# Patient Record
Sex: FEMALE | Race: BLACK OR AFRICAN AMERICAN | Employment: UNEMPLOYED | URBAN - METROPOLITAN AREA
[De-identification: names, ages, dates, MRNs, and addresses within clinical notes are randomized per-mention and may not be internally consistent; named-entity substitution may affect disease eponyms.]

---

## 2017-04-27 ENCOUNTER — OFFICE VISIT (OUTPATIENT)
Dept: INTERNAL MEDICINE CLINIC | Age: 42
End: 2017-04-27

## 2017-04-27 ENCOUNTER — HOSPITAL ENCOUNTER (OUTPATIENT)
Dept: LAB | Age: 42
Discharge: HOME | End: 2017-04-27

## 2017-04-27 VITALS
OXYGEN SATURATION: 96 % | RESPIRATION RATE: 18 BRPM | HEART RATE: 106 BPM | DIASTOLIC BLOOD PRESSURE: 80 MMHG | SYSTOLIC BLOOD PRESSURE: 129 MMHG | HEIGHT: 64 IN | BODY MASS INDEX: 46.32 KG/M2 | WEIGHT: 271.3 LBS | TEMPERATURE: 98.6 F

## 2017-04-27 DIAGNOSIS — Z00.00 MEDICARE ANNUAL WELLNESS VISIT, SUBSEQUENT: Primary | ICD-10-CM

## 2017-04-27 DIAGNOSIS — Z71.89 ADVANCED CARE PLANNING/COUNSELING DISCUSSION: ICD-10-CM

## 2017-04-27 DIAGNOSIS — G89.29 CHRONIC BILATERAL LOW BACK PAIN, WITH SCIATICA PRESENCE UNSPECIFIED: ICD-10-CM

## 2017-04-27 DIAGNOSIS — F32.A DEPRESSION, UNSPECIFIED DEPRESSION TYPE: ICD-10-CM

## 2017-04-27 DIAGNOSIS — I10 ESSENTIAL HYPERTENSION: ICD-10-CM

## 2017-04-27 DIAGNOSIS — M54.5 CHRONIC BILATERAL LOW BACK PAIN, WITH SCIATICA PRESENCE UNSPECIFIED: ICD-10-CM

## 2017-04-27 PROCEDURE — 99001 SPECIMEN HANDLING PT-LAB: CPT | Performed by: NURSE PRACTITIONER

## 2017-04-27 RX ORDER — ALPRAZOLAM 1 MG/1
TABLET ORAL
Refills: 0 | COMMUNITY
Start: 2017-04-06 | End: 2017-10-12 | Stop reason: SDUPTHER

## 2017-04-27 RX ORDER — VENLAFAXINE HYDROCHLORIDE 150 MG/1
CAPSULE, EXTENDED RELEASE ORAL
Refills: 0 | COMMUNITY
Start: 2017-04-06 | End: 2017-08-15 | Stop reason: SDUPTHER

## 2017-04-27 RX ORDER — LISINOPRIL AND HYDROCHLOROTHIAZIDE 20; 25 MG/1; MG/1
TABLET ORAL
Refills: 1 | COMMUNITY
Start: 2017-04-06 | End: 2018-04-20 | Stop reason: SDUPTHER

## 2017-04-27 RX ORDER — ZOLPIDEM TARTRATE 5 MG/1
TABLET ORAL
Refills: 0 | COMMUNITY
Start: 2017-04-06 | End: 2017-10-12 | Stop reason: SDUPTHER

## 2017-04-27 NOTE — ACP (ADVANCE CARE PLANNING)
Advance Care Planning (ACP) Provider Note - Comprehensive     Date of ACP Conversation: 04/27/17  Persons included in Conversation:  patient  Length of ACP Conversation in minutes:  16 minutes    Authorized Decision Maker (if patient is incapable of making informed decisions): This person is:   Other Legally Authorized Decision Maker (e.g. Next of Kin)          General ACP for ALL Patients with Decision Making Capacity:   Importance of advance care planning, including choosing a healthcare agent to communicate patient's healthcare decisions if patient lost the ability to make decisions, such as after a sudden illness or accident  Understanding of the healthcare agent role was assessed and information provided    Review of Existing Advance Directive:  What information were you given about medical decisions to consider before completing your advance directive? none    For Serious or Chronic Illness:  Understanding of medical condition      Interventions Provided:  Referral made for ACP follow-up assistance to:  nurse

## 2017-04-27 NOTE — MR AVS SNAPSHOT
Visit Information Date & Time Provider Department Dept. Phone Encounter #  
 4/27/2017  1:30 PM Yara SOLIS Luis Braun, [address] [id number] Follow-up Instructions Return in about 1 month (around 5/27/2017), or if symptoms worsen or fail to improve. Your Appointments 5/9/2017  2:30 PM  
New Patient with Stalin Mata MD  
[facility] ([facility]) Appt Note: new gyn exam, [address] [address]  
[phone]  
  
   
 [place] [address] Upcoming Health Maintenance Date Due Pneumococcal 19-64 Highest Risk (1 of 3 - PCV13) 3/13/1993 DTaP/Tdap/Td series (1 - Tdap) 3/13/1995 PAP AKA CERVICAL CYTOLOGY 3/13/1995 Allergies as of 4/27/2017  Review Complete On: 4/27/2017 By: Anum Covington. Luis Braun NP Severity Noted Reaction Type Reactions Norvasc [Amlodipine]  04/27/2017    Other (comments) Ankle swelling Current Immunizations  Never Reviewed No immunizations on file. Not reviewed this visit You Were Diagnosed With   
  
 Codes Comments Medicare annual wellness visit, subsequent    -  Primary ICD-10-CM: Z00.00 ICD-9-CM: V70.0 Chronic bilateral low back pain, with sciatica presence unspecified     ICD-10-CM: M54.5, G89.29 ICD-9-CM: 724.2, 338.29 Depression, unspecified depression type     ICD-10-CM: F32.9 ICD-9-CM: 303 Smoker     ICD-10-CM: T11.982 ICD-9-CM: 305.1 Vitals BP Pulse Temp Resp Height(growth percentile) Weight(growth percentile) 129/80 (BP 1 Location: Left arm, BP Patient Position: Sitting) (!) 106 98.6 °F (37 °C) (Oral) 18 5' 4\" (1.626 m) 271 lb 4.8 oz (123.1 kg) LMP SpO2 BMI OB Status Smoking Status 04/06/2017 (Exact Date) 96% 46.57 kg/m2 Having regular periods Current Every Day Smoker BMI and BSA Data Body Mass Index Body Surface Area 46.57 kg/m 2 2.36 m 2 Preferred Pharmacy Pharmacy Name Phone [facility] [address]. [phone] Your Updated Medication List  
  
   
This list is accurate as of: 4/27/17  2:11 PM.  Always use your most recent med list.  
  
  
  
  
 ALPRAZolam 1 mg tablet Commonly known as:  XANAX  
take 1 tablet by mouth twice a day  
  
 lisinopril-hydroCHLOROthiazide 20-25 mg per tablet Commonly known as:  PRINZIDE, ZESTORETIC  
take 1 tablet by mouth once daily  
  
 venlafaxine- mg capsule Commonly known as:  EFFEXOR-XR  
take 1 capsule by mouth once daily  
  
 zolpidem 5 mg tablet Commonly known as:  AMBIEN  
take 1 tablet by mouth at bedtime We Performed the Following LIPID PANEL [11669 CPT(R)] METABOLIC PANEL, COMPREHENSIVE [65432 CPT(R)] REFERRAL TO PAIN MANAGEMENT [QBS548 Custom] Comments:  
 Please evaluate patient for chronic back pain REFERRAL TO PSYCHIATRY [REF91 Custom] Comments:  
 Please evaluate patient for depression TSH REFLEX TO T4 [[id number] Custom] Follow-up Instructions Return in about 1 month (around 5/27/2017), or if symptoms worsen or fail to improve. Referral Information Referral ID Referred By Referred To  
  
 [id number] [facility] Not Available Visits Status Start Date End Date 1 New Request 4/27/17 4/27/18 If your referral has a status of pending review or denied, additional information will be sent to support the outcome of this decision. Referral ID Referred By Referred To  
 [id number] Destinee Craven MD  
   [address] [place] [address] [address] [place] [address], [address] [place] [address] Phone: [phone] Fax: [phone] Visits Status Start Date End Date 1 New Request 4/27/17 4/27/18  If your referral has a status of pending review or denied, additional information will be sent to support the outcome of this decision. Patient Instructions 1. As discussed, narcotics not provided by this provider - pain management providers given or consider seeing previous provider 2. I would like you to establish care with psychiatry. Will provide 3 months of psychiatry medications to give you time to establish care with them. 3. Routine labs given. Medicare Wellness Visit, Female The best way to live healthy is to have a healthy lifestyle by eating a well-balanced diet, exercising regularly, limiting alcohol and stopping smoking. Regular physical exams and screening tests are another way to keep healthy. Preventive exams provided by your health care provider can find health problems before they become diseases or illnesses. Preventive services including immunizations, screening tests, monitoring and exams can help you take care of your own health. All people over age 72 should have a pneumovax  and and a prevnar shot to prevent pneumonia. These are once in a lifetime unless you and your provider decide differently. All people over 65 should have a yearly flu shot and a tetanus vaccine every 10 years. A bone mass density to screen for osteoporosis or thinning of the bones should be done every 2 years after 65. Screening for diabetes mellitus with a blood sugar test should be done every year. Glaucoma is a disease of the eye due to increased ocular pressure that can lead to blindness and it should be done every year by an eye professional. 
 
Cardiovascular screening tests that check for elevated lipids (fatty part of blood) which can lead to heart disease and strokes should be done every 5 years. Colorectal screening that evaluates for blood or polyps in your colon should be done yearly as a stool test or every five years as a flexible sigmoidoscope or every 10 years as a colonoscopy up to age 76. Breast cancer screening with a mammogram is recommended biennially  for women age 54-69. Screening for cervical cancer with a pap smear and pelvic exam is recommended for women after age 72 years every 2 years up to age 79 or when the provider and patient decide to stop. If there is a history of cervical abnormalities or other increased risk for cancer then the test is recommended yearly. Hepatitis C screening is also recommended for anyone born between 80 through [address]. A shingles vaccine is also recommended once in a lifetime after age 61. Your Medicare Wellness Exam is recommended annually. Here is a list of your current Health Maintenance items with a due date: 
Health Maintenance Due Topic Date Due  Pneumococcal Vaccine (1 of 3 - PCV13) 03/13/1993  
 DTaP/Tdap/Td  (1 - Tdap) 03/13/1995  Cervical Cancer Screening  03/13/1995 Stopping Smoking: Care Instructions Your Care Instructions Cigarette smokers crave the nicotine in cigarettes. Giving it up is much harder than simply changing a habit. Your body has to stop craving the nicotine. It is hard to quit, but you can do it. There are many tools that people use to quit smoking. You may find that combining tools works best for you. There are several steps to quitting. First you get ready to quit. Then you get support to help you. After that, you learn new skills and behaviors to become a nonsmoker. For many people, a necessary step is getting and using medicine. Your doctor will help you set up the plan that best meets your needs. You may want to attend a smoking cessation program to help you quit smoking. When you choose a program, look for one that has proven success. Ask your doctor for ideas.  You will greatly increase your chances of success if you take medicine as well as get counseling or join a cessation program. 
Some of the changes you feel when you first quit tobacco are uncomfortable. Your body will miss the nicotine at first, and you may feel short-tempered and grumpy. You may have trouble sleeping or concentrating. Medicine can help you deal with these symptoms. You may struggle with changing your smoking habits and rituals. The last step is the tricky one: Be prepared for the smoking urge to continue for a time. This is a lot to deal with, but keep at it. You will feel better. Follow-up care is a key part of your treatment and safety. Be sure to make and go to all appointments, and call your doctor if you are having problems. Its also a good idea to know your test results and keep a list of the medicines you take. How can you care for yourself at home? · Ask your family, friends, and coworkers for support. You have a better chance of quitting if you have help and support. · Join a support group, such as Nicotine Anonymous, for people who are trying to quit smoking. · Consider signing up for a smoking cessation program, such as the American Lung Association's Freedom from Smoking program. 
· Set a quit date. Pick your date carefully so that it is not right in the middle of a big deadline or stressful time. Once you quit, do not even take a puff. Get rid of all ashtrays and lighters after your last cigarette. Clean your house and your clothes so that they do not smell of smoke. · Learn how to be a nonsmoker. Think about ways you can avoid those things that make you reach for a cigarette. ¨ Avoid situations that put you at greatest risk for smoking. For some people, it is hard to have a drink with friends without smoking. For others, they might skip a coffee break with coworkers who smoke. ¨ Change your daily routine. Take a different route to work or eat a meal in a different place. · Cut down on stress. Calm yourself or release tension by doing an activity you enjoy, such as reading a book, taking a hot bath, or gardening. · Talk to your doctor or pharmacist about nicotine replacement therapy, which replaces the nicotine in your body. You still get nicotine but you do not use tobacco. Nicotine replacement products help you slowly reduce the amount of nicotine you need. These products come in several forms, many of them available over-the-counter: ¨ Nicotine patches ¨ Nicotine gum and lozenges ¨ Nicotine inhaler · Ask your doctor about bupropion (Wellbutrin) or varenicline (Chantix), which are prescription medicines. They do not contain nicotine. They help you by reducing withdrawal symptoms, such as stress and anxiety. · Some people find hypnosis, acupuncture, and massage helpful for ending the smoking habit. · Eat a healthy diet and get regular exercise. Having healthy habits will help your body move past its craving for nicotine. · Be prepared to keep trying. Most people are not successful the first few times they try to quit. Do not get mad at yourself if you smoke again. Make a list of things you learned and think about when you want to try again, such as next week, next month, or next year. Where can you learn more? Go to http://abiSixteen Eighteen Designclifford.info/. Enter F758 in the search box to learn more about \"Stopping Smoking: Care Instructions. \" Current as of: May 26, 2016 Content Version: 11.2 © 5508-8898 [employer]. Care instructions adapted under license by [employer] (which disclaims liability or warranty for this information). If you have questions about a medical condition or this instruction, always ask your healthcare professional. [place] [address] any warranty or liability for your use of this information. Introducing [facility]! [facility] introduces Comfy patient portal. Now you can access parts of your medical record, email your doctor's office, and request medication refills online.    
 
1. In your internet browser, go to [email or web address] 2. Click on the First Time User? Click Here link in the Sign In box. You will see the New Member Sign Up page. 3. Enter your TrashOut Access Code exactly as it appears below. You will not need to use this code after youve completed the sign-up process. If you do not sign up before the expiration date, you must request a new code. · TrashOut Access Code: [email or web address] Expires: 7/26/2017  2:10 PM 
 
4. Enter the last four digits of your Social Security Number (xxxx) and Date of Birth (mm/dd/yyyy) as indicated and click Submit. You will be taken to the next sign-up page. 5. Create a [place] ID. This will be your TrashOut login ID and cannot be changed, so think of one that is secure and easy to remember. 6. Create a TrashOut password. You can change your password at any time. 7. Enter your Password Reset Question and Answer. This can be used at a later time if you forget your password. 8. Enter your e-mail address. You will receive e-mail notification when new information is available in [address] 9. Click Sign Up. You can now view and download portions of your medical record. 10. Click the Download Summary menu link to download a portable copy of your medical information. If you have questions, please visit the Frequently Asked Questions section of the TrashOut website. Remember, TrashOut is NOT to be used for urgent needs. For medical emergencies, dial 911. Now available from your iPhone and Android! Please provide this summary of care documentation to your next provider. Your primary care clinician is listed as Mady Rondon. If you have any questions after today's visit, please call [phone].

## 2017-04-27 NOTE — PATIENT INSTRUCTIONS
1. As discussed, narcotics not provided by this provider - pain management providers given or consider seeing previous provider     2. I would like you to establish care with psychiatry. Will provide 3 months of psychiatry medications to give you time to establish care with them. 3. Routine labs given. Medicare Wellness Visit, Female    The best way to live healthy is to have a healthy lifestyle by eating a well-balanced diet, exercising regularly, limiting alcohol and stopping smoking. Regular physical exams and screening tests are another way to keep healthy. Preventive exams provided by your health care provider can find health problems before they become diseases or illnesses. Preventive services including immunizations, screening tests, monitoring and exams can help you take care of your own health. All people over age 72 should have a pneumovax  and and a prevnar shot to prevent pneumonia. These are once in a lifetime unless you and your provider decide differently. All people over 65 should have a yearly flu shot and a tetanus vaccine every 10 years. A bone mass density to screen for osteoporosis or thinning of the bones should be done every 2 years after 65. Screening for diabetes mellitus with a blood sugar test should be done every year. Glaucoma is a disease of the eye due to increased ocular pressure that can lead to blindness and it should be done every year by an eye professional.    Cardiovascular screening tests that check for elevated lipids (fatty part of blood) which can lead to heart disease and strokes should be done every 5 years. Colorectal screening that evaluates for blood or polyps in your colon should be done yearly as a stool test or every five years as a flexible sigmoidoscope or every 10 years as a colonoscopy up to age 76. Breast cancer screening with a mammogram is recommended biennially  for women age 54-69.     Screening for cervical cancer with a pap smear and pelvic exam is recommended for women after age 72 years every 2 years up to age 79 or when the provider and patient decide to stop. If there is a history of cervical abnormalities or other increased risk for cancer then the test is recommended yearly. Hepatitis C screening is also recommended for anyone born between 80 through [address]. A shingles vaccine is also recommended once in a lifetime after age 61. Your Medicare Wellness Exam is recommended annually. Here is a list of your current Health Maintenance items with a due date:  Health Maintenance Due   Topic Date Due    Pneumococcal Vaccine (1 of 3 - PCV13) 03/13/1993    DTaP/Tdap/Td  (1 - Tdap) 03/13/1995    Cervical Cancer Screening  03/13/1995          Stopping Smoking: Care Instructions  Your Care Instructions  Cigarette smokers crave the nicotine in cigarettes. Giving it up is much harder than simply changing a habit. Your body has to stop craving the nicotine. It is hard to quit, but you can do it. There are many tools that people use to quit smoking. You may find that combining tools works best for you. There are several steps to quitting. First you get ready to quit. Then you get support to help you. After that, you learn new skills and behaviors to become a nonsmoker. For many people, a necessary step is getting and using medicine. Your doctor will help you set up the plan that best meets your needs. You may want to attend a smoking cessation program to help you quit smoking. When you choose a program, look for one that has proven success. Ask your doctor for ideas. You will greatly increase your chances of success if you take medicine as well as get counseling or join a cessation program.  Some of the changes you feel when you first quit tobacco are uncomfortable. Your body will miss the nicotine at first, and you may feel short-tempered and grumpy. You may have trouble sleeping or concentrating.  Medicine can help you deal with these symptoms. You may struggle with changing your smoking habits and rituals. The last step is the tricky one: Be prepared for the smoking urge to continue for a time. This is a lot to deal with, but keep at it. You will feel better. Follow-up care is a key part of your treatment and safety. Be sure to make and go to all appointments, and call your doctor if you are having problems. Its also a good idea to know your test results and keep a list of the medicines you take. How can you care for yourself at home? · Ask your family, friends, and coworkers for support. You have a better chance of quitting if you have help and support. · Join a support group, such as Nicotine Anonymous, for people who are trying to quit smoking. · Consider signing up for a smoking cessation program, such as the American Lung Association's Freedom from Smoking program.  · Set a quit date. Pick your date carefully so that it is not right in the middle of a big deadline or stressful time. Once you quit, do not even take a puff. Get rid of all ashtrays and lighters after your last cigarette. Clean your house and your clothes so that they do not smell of smoke. · Learn how to be a nonsmoker. Think about ways you can avoid those things that make you reach for a cigarette. ¨ Avoid situations that put you at greatest risk for smoking. For some people, it is hard to have a drink with friends without smoking. For others, they might skip a coffee break with coworkers who smoke. ¨ Change your daily routine. Take a different route to work or eat a meal in a different place. · Cut down on stress. Calm yourself or release tension by doing an activity you enjoy, such as reading a book, taking a hot bath, or gardening. · Talk to your doctor or pharmacist about nicotine replacement therapy, which replaces the nicotine in your body.  You still get nicotine but you do not use tobacco. Nicotine replacement products help you slowly reduce the amount of nicotine you need. These products come in several forms, many of them available over-the-counter:  ¨ Nicotine patches  ¨ Nicotine gum and lozenges  ¨ Nicotine inhaler  · Ask your doctor about bupropion (Wellbutrin) or varenicline (Chantix), which are prescription medicines. They do not contain nicotine. They help you by reducing withdrawal symptoms, such as stress and anxiety. · Some people find hypnosis, acupuncture, and massage helpful for ending the smoking habit. · Eat a healthy diet and get regular exercise. Having healthy habits will help your body move past its craving for nicotine. · Be prepared to keep trying. Most people are not successful the first few times they try to quit. Do not get mad at yourself if you smoke again. Make a list of things you learned and think about when you want to try again, such as next week, next month, or next year. Where can you learn more? Go to http://abi-clifford.info/. Enter B772 in the search box to learn more about \"Stopping Smoking: Care Instructions. \"  Current as of: May 26, 2016  Content Version: 11.2  © 4884-7435 [employer]. Care instructions adapted under license by [employer] (which disclaims liability or warranty for this information). If you have questions about a medical condition or this instruction, always ask your healthcare professional. [address] any warranty or liability for your use of this information.
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
## 2017-04-28 LAB
ALBUMIN SERPL-MCNC: 4.1 G/DL (ref 3.5–5.5)
ALBUMIN/GLOB SERPL: 1.4 {RATIO} (ref 1.2–2.2)
ALP SERPL-CCNC: 101 IU/L (ref 39–117)
ALT SERPL-CCNC: 29 IU/L (ref 0–32)
AST SERPL-CCNC: 18 IU/L (ref 0–40)
BILIRUB SERPL-MCNC: 0.2 MG/DL (ref 0–1.2)
BUN SERPL-MCNC: 13 MG/DL (ref 6–24)
BUN/CREAT SERPL: 18 (ref 9–23)
CALCIUM SERPL-MCNC: 9.4 MG/DL (ref 8.7–10.2)
CHLORIDE SERPL-SCNC: 102 MMOL/L (ref 96–106)
CHOLEST SERPL-MCNC: 194 MG/DL (ref 100–199)
CO2 SERPL-SCNC: 22 MMOL/L (ref 18–29)
CREAT SERPL-MCNC: 0.72 MG/DL (ref 0.57–1)
GLOBULIN SER CALC-MCNC: 3 G/DL (ref 1.5–4.5)
GLUCOSE SERPL-MCNC: 102 MG/DL (ref 65–99)
HDLC SERPL-MCNC: 45 MG/DL
INTERPRETATION, 910389: NORMAL
LDLC SERPL CALC-MCNC: 121 MG/DL (ref 0–99)
POTASSIUM SERPL-SCNC: 4.1 MMOL/L (ref 3.5–5.2)
PROT SERPL-MCNC: 7.1 G/DL (ref 6–8.5)
SODIUM SERPL-SCNC: 139 MMOL/L (ref 134–144)
TRIGL SERPL-MCNC: 139 MG/DL (ref 0–149)
TSH SERPL DL<=0.005 MIU/L-ACNC: 1.07 UIU/ML (ref 0.45–4.5)
VLDLC SERPL CALC-MCNC: 28 MG/DL (ref 5–40)

## 2017-05-10 PROBLEM — G47.30 SLEEP APNEA: Status: ACTIVE | Noted: 2017-05-10

## 2017-05-10 PROBLEM — G89.29 CHRONIC PAIN: Status: ACTIVE | Noted: 2017-05-10

## 2017-06-21 ENCOUNTER — OFFICE VISIT (OUTPATIENT)
Dept: BEHAVIORAL/MENTAL HEALTH CLINIC | Age: 42
End: 2017-06-21

## 2017-06-21 VITALS
SYSTOLIC BLOOD PRESSURE: 96 MMHG | BODY MASS INDEX: 46.78 KG/M2 | DIASTOLIC BLOOD PRESSURE: 67 MMHG | HEIGHT: 64 IN | HEART RATE: 90 BPM | OXYGEN SATURATION: 98 % | WEIGHT: 274 LBS

## 2017-06-21 DIAGNOSIS — F32.A ANXIETY AND DEPRESSION: Primary | ICD-10-CM

## 2017-06-21 DIAGNOSIS — F41.9 ANXIETY AND DEPRESSION: Primary | ICD-10-CM

## 2017-06-21 RX ORDER — VENLAFAXINE HYDROCHLORIDE 150 MG/1
150 CAPSULE, EXTENDED RELEASE ORAL DAILY
Qty: 30 CAP | Refills: 1 | Status: SHIPPED | OUTPATIENT
Start: 2017-06-21 | End: 2017-10-12 | Stop reason: SDUPTHER

## 2017-06-21 RX ORDER — VENLAFAXINE HYDROCHLORIDE 75 MG/1
75 CAPSULE, EXTENDED RELEASE ORAL DAILY
Qty: 30 CAP | Refills: 1 | Status: SHIPPED | OUTPATIENT
Start: 2017-06-21 | End: 2017-08-15 | Stop reason: SDUPTHER
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
## 2017-08-15 ENCOUNTER — OFFICE VISIT (OUTPATIENT)
Dept: BEHAVIORAL/MENTAL HEALTH CLINIC | Age: 42
End: 2017-08-15

## 2017-08-15 VITALS
WEIGHT: 278 LBS | HEART RATE: 98 BPM | OXYGEN SATURATION: 97 % | HEIGHT: 64 IN | SYSTOLIC BLOOD PRESSURE: 108 MMHG | BODY MASS INDEX: 47.46 KG/M2 | DIASTOLIC BLOOD PRESSURE: 82 MMHG

## 2017-08-15 DIAGNOSIS — F32.A ANXIETY AND DEPRESSION: Primary | ICD-10-CM

## 2017-08-15 DIAGNOSIS — F41.9 ANXIETY AND DEPRESSION: Primary | ICD-10-CM

## 2017-08-15 RX ORDER — VENLAFAXINE HYDROCHLORIDE 75 MG/1
75 CAPSULE, EXTENDED RELEASE ORAL DAILY
Qty: 30 CAP | Refills: 1 | Status: SHIPPED | OUTPATIENT
Start: 2017-08-15 | End: 2017-10-12 | Stop reason: SDUPTHER
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
## 2017-09-21 ENCOUNTER — OFFICE VISIT (OUTPATIENT)
Dept: BEHAVIORAL/MENTAL HEALTH CLINIC | Age: 42
End: 2017-09-21

## 2017-09-21 VITALS
OXYGEN SATURATION: 97 % | HEART RATE: 113 BPM | SYSTOLIC BLOOD PRESSURE: 122 MMHG | DIASTOLIC BLOOD PRESSURE: 80 MMHG | WEIGHT: 273 LBS | HEIGHT: 64 IN | BODY MASS INDEX: 46.61 KG/M2

## 2017-09-21 DIAGNOSIS — F32.A ANXIETY AND DEPRESSION: Primary | ICD-10-CM

## 2017-09-21 DIAGNOSIS — F41.9 ANXIETY AND DEPRESSION: Primary | ICD-10-CM

## 2017-09-21 NOTE — MR AVS SNAPSHOT
Visit Information Date & Time Provider Department Dept. Phone Encounter #  
 9/21/2017  8:30 AM Shruthi Mack, [facility] [phone] [id number] Your Appointments 10/12/2017 10:00 AM  
ESTABLISHED PATIENT with Mono Melissa NP [facility] ([facility]) Appt Note: 2 month follow-up [address] [facility] [address] [facility] Karly Marrero 178  
  
   
 [address] [facility] [address] [facility] [address] [place] [address] Upcoming Health Maintenance Date Due Pneumococcal 19-64 Highest Risk (1 of 3 - PCV13) 3/13/1993 DTaP/Tdap/Td series (1 - Tdap) 3/13/1995 PAP AKA CERVICAL CYTOLOGY 3/13/1995 INFLUENZA AGE 9 TO ADULT 8/1/2017 Allergies as of 9/21/2017  Review Complete On: 9/21/2017 By: Mariusz Warner CNA Severity Noted Reaction Type Reactions Norvasc [Amlodipine]  04/27/2017    Other (comments) Ankle swelling Current Immunizations  Never Reviewed No immunizations on file. Not reviewed this visit Vitals BP Pulse Height(growth percentile) Weight(growth percentile) SpO2 BMI  
 122/80 (BP 1 Location: Left arm, BP Patient Position: Sitting) (!) 113 5' 4\" (1.626 m) 273 lb (123.8 kg) 97% 46.86 kg/m2 OB Status Smoking Status Having regular periods Current Every Day Smoker Vitals History BMI and BSA Data Body Mass Index Body Surface Area  
 46.86 kg/m 2 2.36 m 2 Preferred Pharmacy Pharmacy Name Phone [facility] [address] [place] [address] [phone] Your Updated Medication List  
  
   
This list is accurate as of: 9/21/17  9:16 AM.  Always use your most recent med list.  
  
  
  
  
 ALPRAZolam 1 mg tablet Commonly known as:  XANAX  
take 1 tablet by mouth twice a day  
  
 lisinopril-hydroCHLOROthiazide 20-25 mg per tablet Commonly known as:  PRINZIDE, ZESTORETIC  
take 1 tablet by mouth once daily * venlafaxine- mg capsule Commonly known as:  EFFEXOR-XR Take 1 Cap by mouth daily. * venlafaxine-SR 75 mg capsule Commonly known as:  EFFEXOR-XR Take 1 Cap by mouth daily. zolpidem 5 mg tablet Commonly known as:  AMBIEN  
take 1 tablet by mouth at bedtime * Notice: This list has 2 medication(s) that are the same as other medications prescribed for you. Read the directions carefully, and ask your doctor or other care provider to review them with you. Introducing [facility] & HEALTH SERVICES! [address] [facility] introduces [employer] patient portal. Now you can access parts of your medical record, email your doctor's office, and request medication refills online. 1. In your internet browser, go to [email or web address] 2. Click on the First Time User? Click Here link in the Sign In box. You will see the New Member Sign Up page. 3. Enter your Ps[employer]dex Access Code exactly as it appears below. You will not need to use this code after youve completed the sign-up process. If you do not sign up before the expiration date, you must request a new code. · Psydex Access Code: [email or web address] Expires: 12/20/2017  9:16 AM 
 
4. Enter the last four digits of your Social Security Number (xxxx) and Date of Birth (mm/dd/yyyy) as indicated and click Submit. You will be taken to the next sign-up page. 5. Create a Psydex ID. This will be your Psydex login ID and cannot be changed, so think of one that is secure and easy to remember. 6. Create a Psydex password. You can change your password at any time. 7. Enter your Password Reset Question and Answer. This can be used at a later time if you forget your password. 8. Enter your e-mail address. You will receive e-mail notification when new information is available in [address] 9. Click Sign Up. You can now view and download portions of your medical record.  
10. Click the Download Summary menu link to download a portable copy of your medical information. If you have questions, please visit the Frequently Asked Questions section of the .[email or web address] website. Remember, [employer]build is NOT to be used for urgent needs. For medical emergencies, dial 911. Now available from your iPhone and Android! Please provide this summary of care documentation to your next provider. Your primary care clinician is listed as Alonso Anguiano. If you have any questions after today's visit, please call [phone].
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
## 2017-10-03 ENCOUNTER — OFFICE VISIT (OUTPATIENT)
Dept: BEHAVIORAL/MENTAL HEALTH CLINIC | Age: 42
End: 2017-10-03

## 2017-10-03 VITALS
OXYGEN SATURATION: 99 % | HEART RATE: 92 BPM | SYSTOLIC BLOOD PRESSURE: 120 MMHG | BODY MASS INDEX: 46.44 KG/M2 | DIASTOLIC BLOOD PRESSURE: 80 MMHG | HEIGHT: 64 IN | WEIGHT: 272 LBS

## 2017-10-03 DIAGNOSIS — F41.9 ANXIETY AND DEPRESSION: Primary | ICD-10-CM

## 2017-10-03 DIAGNOSIS — F32.A ANXIETY AND DEPRESSION: Primary | ICD-10-CM

## 2017-10-03 NOTE — MR AVS SNAPSHOT
Visit Information Date & Time Provider Department Dept. Phone Encounter #  
 10/3/2017 10:30 AM [facility] [phone] [id number] Your Appointments 10/12/2017 10:00 AM  
ESTABLISHED PATIENT with Andres Brandt NP [facility] ([facility]) Appt Note: 2 month follow-up [address] [facility] [address] [place] [address] Karly Marrero 178  
  
   
 [address] [facility] [address] Upcoming Health Maintenance Date Due Pneumococcal 19-64 Highest Risk (1 of 3 - PCV13) 3/13/1993 DTaP/Tdap/Td series (1 - Tdap) 3/13/1995 PAP AKA CERVICAL CYTOLOGY 3/13/1995 INFLUENZA AGE 9 TO ADULT 8/1/2017 Allergies as of 10/3/2017  Review Complete On: 10/3/2017 By: Heidy Ramos Severity Noted Reaction Type Reactions Norvasc [Amlodipine]  04/27/2017    Other (comments) Ankle swelling Current Immunizations  Never Reviewed No immunizations on file. Not reviewed this visit Vitals BP Pulse Height(growth percentile) Weight(growth percentile) SpO2 BMI  
 120/80 (BP 1 Location: Left arm, BP Patient Position: Sitting) 92 5' 4\" (1.626 m) 272 lb (123.4 kg) 99% 46.69 kg/m2 OB Status Smoking Status Having regular periods Current Every Day Smoker Vitals History BMI and BSA Data Body Mass Index Body Surface Area  
 46.69 kg/m 2 2.36 m 2 Preferred Pharmacy Pharmacy Name Phone [facility] [address] [place] [address] [phone] Your Updated Medication List  
  
   
This list is accurate as of: 10/3/17 11:17 AM.  Always use your most recent med list.  
  
  
  
  
 ALPRAZolam 1 mg tablet Commonly known as:  XANAX  
take 1 tablet by mouth twice a day  
  
 lisinopril-hydroCHLOROthiazide 20-25 mg per tablet Commonly known as:  PRINZIDE, ZESTORETIC  
take 1 tablet by mouth once daily * venlafaxine- mg capsule Commonly known as:  EFFEXOR-XR Take 1 Cap by mouth daily. * venlafaxine-SR 75 mg capsule Commonly known as:  EFFEXOR-XR Take 1 Cap by mouth daily. zolpidem 5 mg tablet Commonly known as:  AMBIEN  
take 1 tablet by mouth at bedtime * Notice: This list has 2 medication(s) that are the same as other medications prescribed for you. Read the directions carefully, and ask your doctor or other care provider to review them with you. Introducing [facility] & HEALTH SERVICES! Molina Mag[place] introduces [employer] patient portal. Now you can access parts of your medical record, email your doctor's office, and request medication refills online. 1. In your internet browser, go to [email or web address] 2. Click on the First Time User? Click Here link in the Sign In box. You will see the New Member Sign Up page. 3. Enter your [employer] Access Code exactly as it appears below. You will not need to use this code after youve completed the sign-up process. If you do not sign up before the expiration date, you must request a new code. · [employer] Access Code: [email or web address] Expires: 12/20/2017  9:16 AM 
 
4. Enter the last four digits of your Social Security Number (xxxx) and Date of Birth (mm/dd/yyyy) as indicated and click Submit. You will be taken to the next sign-up page. 5. Create a [employer] ID. This will be your [employer] login ID and cannot be changed, so think of one that is secure and easy to remember. 6. Create a [employer] password. You can change your password at any time. 7. Enter your Password Reset Question and Answer. This can be used at a later time if you forget your password. 8. Enter your e-mail address. You will receive e-mail notification when new information is available in [address] 9. Click Sign Up. You can now view and download portions of your medical record.  
10. Click the Download Summary menu link to download a portable copy of your medical information. If you have questions, please visit the Frequently Asked Questions section of the Yabidu website. Remember, Yab[employer]u is NOT to be used for urgent needs. For medical emergencies, dial 911. Now available from your iPhone and Android! Please provide this summary of care documentation to your next provider. Your primary care clinician is listed as Crystal Laird. If you have any questions after today's visit, please call [phone].
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
## 2017-10-12 ENCOUNTER — OFFICE VISIT (OUTPATIENT)
Dept: BEHAVIORAL/MENTAL HEALTH CLINIC | Age: 42
End: 2017-10-12

## 2017-10-12 VITALS
SYSTOLIC BLOOD PRESSURE: 114 MMHG | HEART RATE: 91 BPM | WEIGHT: 280 LBS | HEIGHT: 64 IN | BODY MASS INDEX: 47.8 KG/M2 | OXYGEN SATURATION: 98 % | DIASTOLIC BLOOD PRESSURE: 76 MMHG

## 2017-10-12 DIAGNOSIS — F32.A ANXIETY AND DEPRESSION: Primary | ICD-10-CM

## 2017-10-12 DIAGNOSIS — F41.9 ANXIETY AND DEPRESSION: Primary | ICD-10-CM

## 2017-10-12 DIAGNOSIS — Z72.0 TOBACCO USE: ICD-10-CM

## 2017-10-12 RX ORDER — VENLAFAXINE HYDROCHLORIDE 150 MG/1
150 CAPSULE, EXTENDED RELEASE ORAL DAILY
Qty: 30 CAP | Refills: 2 | Status: SHIPPED | OUTPATIENT
Start: 2017-10-12 | End: 2018-03-04

## 2017-10-12 RX ORDER — ALPRAZOLAM 1 MG/1
TABLET ORAL
Qty: 60 TAB | Refills: 2 | Status: SHIPPED | OUTPATIENT
Start: 2017-10-12 | End: 2022-06-01

## 2017-10-12 RX ORDER — ZOLPIDEM TARTRATE 5 MG/1
TABLET ORAL
Qty: 30 TAB | Refills: 1 | Status: SHIPPED | OUTPATIENT
Start: 2017-10-12 | End: 2018-04-20 | Stop reason: SDUPTHER

## 2017-10-12 RX ORDER — VENLAFAXINE HYDROCHLORIDE 75 MG/1
75 CAPSULE, EXTENDED RELEASE ORAL DAILY
Qty: 30 CAP | Refills: 2 | Status: SHIPPED | OUTPATIENT
Start: 2017-10-12 | End: 2018-04-20 | Stop reason: SDUPTHER
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
## 2017-10-12 NOTE — MR AVS SNAPSHOT
Visit Information Date & Time Provider Department Dept. Phone Encounter #  
 10/12/2017 10:00 AM Everette Trujillo NP [facility] [phone] Your Appointments 10/17/2017  9:30 AM  
COUNSELING with Rogelio Breen LCSW [facility] ([facility]) Appt Note: Counseling [address] [facility] [address] [facility] Karly Marrero [address]  
  
   
 [address] [place] [address] [address] [place] 7 [address] Upcoming Health Maintenance Date Due Pneumococcal 19-64 Highest Risk (1 of 3 - PCV13) 3/13/1993 DTaP/Tdap/Td series (1 - Tdap) 3/13/1995 PAP AKA CERVICAL CYTOLOGY 3/13/1995 INFLUENZA AGE 9 TO ADULT 8/1/2017 Allergies as of 10/12/2017  Review Complete On: 10/12/2017 By: Ezio Delong CNA Severity Noted Reaction Type Reactions Norvasc [Amlodipine]  04/27/2017    Other (comments) Ankle swelling Current Immunizations  Never Reviewed No immunizations on file. Not reviewed this visit You Were Diagnosed With   
  
 Codes Comments Anxiety and depression    -  Primary ICD-10-CM: F41.8 ICD-9-CM: 300.00, 311 Tobacco use     ICD-10-CM: Z72.0 ICD-9-CM: 305.1 Vitals BP Pulse Height(growth percentile) Weight(growth percentile) SpO2 BMI  
 114/76 (BP 1 Location: Left arm, BP Patient Position: Sitting) 91 5' 4\" (1.626 m) 280 lb (127 kg) 98% 48.06 kg/m2 OB Status Smoking Status Having regular periods Current Every Day Smoker Vitals History BMI and BSA Data Body Mass Index Body Surface Area 48.06 kg/m 2 2.39 m 2 Preferred Pharmacy Pharmacy Name Phone [facility] [address] [place] [address] [phone] Your Updated Medication List  
  
   
This list is accurate as of: 10/12/17 10:28 AM.  Always use your most recent med list.  
  
  
  
  
 ALPRAZolam 1 mg tablet Commonly known as:  [place] take 1 tablet by mouth twice a day  
  
 lisinopril-hydroCHLOROthiazide 20-25 mg per tablet Commonly known as:  PRINZIDE, ZESTORETIC  
take 1 tablet by mouth once daily * venlafaxine- mg capsule Commonly known as:  EFFEXOR-XR Take 1 Cap by mouth daily. * venlafaxine-SR 75 mg capsule Commonly known as:  EFFEXOR-XR Take 1 Cap by mouth daily. zolpidem 5 mg tablet Commonly known as:  AMBIEN  
take 1 tablet by mouth at bedtime * Notice: This list has 2 medication(s) that are the same as other medications prescribed for you. Read the directions carefully, and ask your doctor or other care provider to review them with you. Prescriptions Printed Refills ALPRAZolam (XANAX) 1 mg tablet 2 Sig: take 1 tablet by mouth twice a day Class: Print  
 zolpidem (AMBIEN) 5 mg tablet 1 Sig: take 1 tablet by mouth at bedtime Class: Print Prescriptions Sent to Pharmacy Refills  
 venlafaxine-SR (EFFEXOR-XR) 150 mg capsule 2 Sig: Take 1 Cap by mouth daily. Class: Normal  
 Pharmacy: [address] [place] [address]. Ph #: [phone] Route: Oral  
 venlafaxine-SR (EFFEXOR-XR) 75 mg capsule 2 Sig: Take 1 Cap by mouth daily. Class: Normal  
 Pharmacy: [address] [place] [address]. Ph #: [phone] Route: Oral  
  
We Performed the Following 10-PANEL URINE DRUG SCREEN [[id number] Custom] Introducing [facility] SERVICES! [facility] introduces [employer] patient portal. Now you can access parts of your medical record, email your doctor's office, and request medication refills online. 1. In your internet browser, go to [email or web address] 2. Click on the First Time User? Click Here link in the Sign In box. You will see the New Member Sign Up page. 3. Enter your [employer] Access Code exactly as it appears below.  You will not need to use this code after youve completed the sign-up process. If you do not sign up before the expiration date, you must request a new code. · [email or web address] Access Code: [email or web address] Expires: 12/20/2017  9:16 AM 
 
4. Enter the last four digits of your Social Security Number (xxxx) and Date of Birth (mm/dd/yyyy) as indicated and click Submit. You will be taken to the next sign-up page. 5. Create a [email or web address] ID. This will be your [email or web address] login ID and cannot be changed, so think of one that is secure and easy to remember. 6. Create a [employer] password. You can change your password at any time. 7. Enter your Password Reset Question and Answer. This can be used at a later time if you forget your password. 8. Enter your e-mail address. You will receive e-mail notification when new information is available in [address] 9. Click Sign Up. You can now view and download portions of your medical record. 10. Click the Download Summary menu link to download a portable copy of your medical information. If you have questions, please visit the Frequently Asked Questions section of the [email or web address] website. Remember, [employer] is NOT to be used for urgent needs. For medical emergencies, dial 911. Now available from your iPhone and Android! Please provide this summary of care documentation to your next provider. Your primary care clinician is listed as Rahel Dubose. If you have any questions after today's visit, please call [phone].

## 2017-10-17 ENCOUNTER — OFFICE VISIT (OUTPATIENT)
Dept: BEHAVIORAL/MENTAL HEALTH CLINIC | Age: 42
End: 2017-10-17

## 2017-10-17 VITALS
SYSTOLIC BLOOD PRESSURE: 104 MMHG | HEART RATE: 101 BPM | DIASTOLIC BLOOD PRESSURE: 73 MMHG | BODY MASS INDEX: 47.29 KG/M2 | OXYGEN SATURATION: 98 % | WEIGHT: 277 LBS | HEIGHT: 64 IN

## 2017-10-17 DIAGNOSIS — F41.9 ANXIETY AND DEPRESSION: Primary | ICD-10-CM

## 2017-10-17 DIAGNOSIS — F32.A ANXIETY AND DEPRESSION: Primary | ICD-10-CM
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
## 2017-11-27 ENCOUNTER — OFFICE VISIT (OUTPATIENT)
Dept: BEHAVIORAL/MENTAL HEALTH CLINIC | Age: 42
End: 2017-11-27

## 2017-11-27 VITALS — HEIGHT: 64 IN

## 2017-11-27 DIAGNOSIS — F32.A ANXIETY AND DEPRESSION: Primary | ICD-10-CM

## 2017-11-27 DIAGNOSIS — F41.9 ANXIETY AND DEPRESSION: Primary | ICD-10-CM

## 2017-11-27 NOTE — MR AVS SNAPSHOT
Visit Information Date & Time Provider Department Dept. Phone Encounter #  
 11/27/2017  9:30 AM Michael Yusuf, [address] [facility] [phone] [id number] Your Appointments 12/11/2017 10:30 AM  
ESTABLISHED PATIENT with Shalini Leach NP [facility] ([facility] Appt Note: 2 month follow-up [address] [facility] [address] [facility] Karly Marrero 178  
  
   
 [address] [facility] [address] [facility] [address] [facility] [address] Upcoming Health Maintenance Date Due Pneumococcal 19-64 Highest Risk (1 of 3 - PCV13) 3/13/1993 DTaP/Tdap/Td series (1 - Tdap) 3/13/1995 PAP AKA CERVICAL CYTOLOGY 3/13/1995 Influenza Age 5 to Adult 8/1/2017 Allergies as of 11/27/2017  Review Complete On: 10/17/2017 By: Fred Shepard Severity Noted Reaction Type Reactions Norvasc [Amlodipine]  04/27/2017    Other (comments) Ankle swelling Current Immunizations  Never Reviewed No immunizations on file. Not reviewed this visit Vitals Height(growth percentile) OB Status Smoking Status 5' 4\" (1.626 m) Having regular periods Current Every Day Smoker Preferred Pharmacy Pharmacy Name Phone [facility] [address] [phone] Your Updated Medication List  
  
   
This list is accurate as of: 11/27/17 10:45 AM.  Always use your most recent med list.  
  
  
  
  
 ALPRAZolam 1 mg tablet Commonly known as:  XANAX  
take 1 tablet by mouth twice a day  
  
 lisinopril-hydroCHLOROthiazide 20-25 mg per tablet Commonly known as:  PRINZIDE, ZESTORETIC  
take 1 tablet by mouth once daily * venlafaxine- mg capsule Commonly known as:  EFFEXOR-XR Take 1 Cap by mouth daily. * venlafaxine-SR 75 mg capsule Commonly known as:  EFFEXOR-XR Take 1 Cap by mouth daily. zolpidem 5 mg tablet Commonly known as:  AMBIEN  
 take 1 tablet by mouth at bedtime * Notice: This list has 2 medication(s) that are the same as other medications prescribed for you. Read the directions carefully, and ask your doctor or other care provider to review them with you. Introducing [facility] & HEALTH SERVICES! [facility] introduces [employer] patient portal. Now you can access parts of your medical record, email your doctor's office, and request medication refills online. 1. In your internet browser, go to [email or web address] 2. Click on the First Time User? Click Here link in the Sign In box. You will see the New Member Sign Up page. 3. Enter your [employer] Access Code exactly as it appears below. You will not need to use this code after youve completed the sign-up process. If you do not sign up before the expiration date, you must request a new code. · [employer] Access Code: [email or web address] Expires: 12/20/2017  8:16 AM 
 
4. Enter the last four digits of your Social Security Number (xxxx) and Date of Birth (mm/dd/yyyy) as indicated and click Submit. You will be taken to the next sign-up page. 5. Create a [employer] ID. This will be your [employer] login ID and cannot be changed, so think of one that is secure and easy to remember. 6. Create a [employer] password. You can change your password at any time. 7. Enter your Password Reset Question and Answer. This can be used at a later time if you forget your password. 8. Enter your e-mail address. You will receive e-mail notification when new information is available in [address] 9. Click Sign Up. You can now view and download portions of your medical record. 10. Click the Download Summary menu link to download a portable copy of your medical information. If you have questions, please visit the Frequently Asked Questions section of the [employer] website. Remember, [employer] is NOT to be used for urgent needs. For medical emergencies, dial 911. Now available from your iPhone and Android! Please provide this summary of care documentation to your next provider. Your primary care clinician is listed as Kandi Crane. If you have any questions after today's visit, please call [phone].
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
## 2017-12-27 ENCOUNTER — OFFICE VISIT (OUTPATIENT)
Dept: BEHAVIORAL/MENTAL HEALTH CLINIC | Age: 42
End: 2017-12-27

## 2017-12-27 VITALS
HEIGHT: 64 IN | WEIGHT: 280 LBS | BODY MASS INDEX: 47.8 KG/M2 | DIASTOLIC BLOOD PRESSURE: 78 MMHG | HEART RATE: 88 BPM | OXYGEN SATURATION: 98 % | SYSTOLIC BLOOD PRESSURE: 108 MMHG

## 2017-12-27 DIAGNOSIS — F32.A ANXIETY AND DEPRESSION: Primary | ICD-10-CM

## 2017-12-27 DIAGNOSIS — F41.9 ANXIETY AND DEPRESSION: Primary | ICD-10-CM

## 2017-12-27 NOTE — MR AVS SNAPSHOT
Visit Information Date & Time Provider Department Dept. Phone Encounter #  
 12/27/2017 10:30 AM [facility] [phone] [id number] Your Appointments 1/11/2018  4:00 PM  
ESTABLISHED PATIENT with Talon Chi NP [facility] ([facility]) Appt Note: 2 month follow-up; 12.8. 17-confirmed; 2 month follow-up [address] [facility] 
[address] [place]  
[phone]  
  
   
 [address] [facility] [address]  
  
    
 1/15/2018 11:30 AM  
COUNSELING with Ramirez Felix LCSW [facility] ([facility]) Appt Note: Counseling [address] [facility] [address] [facility] Karly Marrero [address]  
  
   
 [address] [facility] [address] [facility] [address] [address] Upcoming Health Maintenance Date Due Pneumococcal 19-64 Highest Risk (1 of 3 - PCV13) 3/13/1993 DTaP/Tdap/Td series (1 - Tdap) 3/13/1995 PAP AKA CERVICAL CYTOLOGY 3/13/1995 Influenza Age 5 to Adult 8/1/2017 Allergies as of 12/27/2017  Review Complete On: 12/27/2017 By: Antonio Maciel Severity Noted Reaction Type Reactions Norvasc [Amlodipine]  04/27/2017    Other (comments) Ankle swelling Current Immunizations  Never Reviewed No immunizations on file. Not reviewed this visit Vitals BP Pulse Height(growth percentile) Weight(growth percentile) SpO2 BMI  
 108/78 (BP 1 Location: Left arm, BP Patient Position: Sitting) 88 5' 4\" (1.626 m) 280 lb (127 kg) 98% 48.06 kg/m2 OB Status Smoking Status Having regular periods Current Every Day Smoker Vitals History BMI and BSA Data Body Mass Index Body Surface Area 48.06 kg/m 2 2.39 m 2 Preferred Pharmacy Pharmacy Name Phone [facility] [address] [phone] Your Updated Medication List  
  
   
 This list is accurate as of: 12/27/17 12:56 PM.  Always use your most recent med list.  
  
  
  
  
 ALPRAZolam 1 mg tablet Commonly known as:  XANAX  
take 1 tablet by mouth twice a day  
  
 lisinopril-hydroCHLOROthiazide 20-25 mg per tablet Commonly known as:  PRINZIDE, ZESTORETIC  
take 1 tablet by mouth once daily * venlafaxine- mg capsule Commonly known as:  EFFEXOR-XR Take 1 Cap by mouth daily. * venlafaxine-SR 75 mg capsule Commonly known as:  EFFEXOR-XR Take 1 Cap by mouth daily. zolpidem 5 mg tablet Commonly known as:  AMBIEN  
take 1 tablet by mouth at bedtime * Notice: This list has 2 medication(s) that are the same as other medications prescribed for you. Read the directions carefully, and ask your doctor or other care provider to review them with you. Introducing [facility] & HEALTH SERVICES! Jennifer Elliott introduces [employer] patient portal. Now you can access parts of your medical record, email your doctor's office, and request medication refills online. 1. In your internet browser, go to [email or web address] 2. Click on the First Time User? Click Here link in the Sign In box. You will see the New Member Sign Up page. 3. Enter your [employer] Access Code exactly as it appears below. You will not need to use this code after youve completed the sign-up process. If you do not sign up before the expiration date, you must request a new code. · [employer] Access Code: [email or web address] Expires: 3/27/2018 12:56 PM 
 
4. Enter the last four digits of your Social Security Number (xxxx) and Date of Birth (mm/dd/yyyy) as indicated and click Submit. You will be taken to the next sign-up page. 5. Create a [employer] ID. This will be your [employer] login ID and cannot be changed, so think of one that is secure and easy to remember. 6. Create a C[employer]onPixelt password. You can change your password at any time. 7. Enter your Password Reset Question and Answer. This can be used at a later time if you forget your password. 8. Enter your e-mail address. You will receive e-mail notification when new information is available in [address] 9. Click Sign Up. You can now view and download portions of your medical record. 10. Click the Download Summary menu link to download a portable copy of your medical information. If you have questions, please visit the Frequently Asked Questions section of the [employer] website. Remember, [employer] is NOT to be used for urgent needs. For medical emergencies, dial 911. Now available from your iPhone and Android! Please provide this summary of care documentation to your next provider. Your primary care clinician is listed as Shira Stewart. If you have any questions after today's visit, please call [phone].
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
## 2017-12-28 RX ORDER — ZOLPIDEM TARTRATE 5 MG/1
TABLET ORAL
Qty: 30 TAB | Refills: 1 | OUTPATIENT
Start: 2017-12-28

## 2018-01-02 ENCOUNTER — TELEPHONE (OUTPATIENT)
Dept: BEHAVIORAL/MENTAL HEALTH CLINIC | Age: 43
End: 2018-01-02

## 2018-01-02 RX ORDER — ALPRAZOLAM 1 MG/1
TABLET ORAL
Qty: 60 TAB | Refills: 2 | OUTPATIENT
Start: 2018-01-02

## 2018-01-02 NOTE — TELEPHONE ENCOUNTER
Pt requested a refill for Xanax. Pt has not done labs yet ordered on 10/12/17. Left vm informing pt that refills will not be authorized for the Xanax and Ambien until results are received from [address] [place] [address]. Left call back number.

## 2018-01-11 DIAGNOSIS — F41.9 ANXIETY AND DEPRESSION: Primary | ICD-10-CM

## 2018-01-11 DIAGNOSIS — F32.A ANXIETY AND DEPRESSION: Primary | ICD-10-CM

## 2018-01-30 RX ORDER — ALPRAZOLAM 1 MG/1
TABLET ORAL
Qty: 60 TAB | Refills: 2 | OUTPATIENT
Start: 2018-01-30

## 2018-02-28 RX ORDER — ALPRAZOLAM 1 MG/1
TABLET ORAL
Qty: 60 TAB | Refills: 2 | OUTPATIENT
Start: 2018-02-28

## 2018-03-04 ENCOUNTER — HOSPITAL ENCOUNTER (EMERGENCY)
Age: 43
Discharge: HOME | End: 2018-03-05
Attending: EMERGENCY MEDICINE | Admitting: EMERGENCY MEDICINE
Payer: MEDICARE

## 2018-03-04 ENCOUNTER — APPOINTMENT (OUTPATIENT)
Dept: CT IMAGING | Age: 43
End: 2018-03-04
Attending: PHYSICIAN ASSISTANT
Payer: MEDICARE

## 2018-03-04 VITALS
HEART RATE: 99 BPM | OXYGEN SATURATION: 97 % | HEIGHT: 64 IN | TEMPERATURE: 98.7 F | RESPIRATION RATE: 20 BRPM | SYSTOLIC BLOOD PRESSURE: 126 MMHG | WEIGHT: 270 LBS | BODY MASS INDEX: 46.1 KG/M2 | DIASTOLIC BLOOD PRESSURE: 70 MMHG

## 2018-03-04 DIAGNOSIS — N30.00 ACUTE CYSTITIS WITHOUT HEMATURIA: Primary | ICD-10-CM

## 2018-03-04 LAB
ALBUMIN SERPL-MCNC: 3.2 G/DL (ref 3.5–5)
ALBUMIN SERPL-MCNC: 3.4 G/DL (ref 3.5–5)
ALBUMIN/GLOB SERPL: 0.8 {RATIO} (ref 1.1–2.2)
ALBUMIN/GLOB SERPL: 0.9 {RATIO} (ref 1.1–2.2)
ALP SERPL-CCNC: 95 U/L (ref 45–117)
ALP SERPL-CCNC: 96 U/L (ref 45–117)
ALT SERPL-CCNC: 27 U/L (ref 12–78)
ALT SERPL-CCNC: 28 U/L (ref 12–78)
ANION GAP SERPL CALC-SCNC: 9 MMOL/L (ref 5–15)
ANION GAP SERPL CALC-SCNC: 9 MMOL/L (ref 5–15)
APPEARANCE UR: ABNORMAL
AST SERPL-CCNC: 13 U/L (ref 15–37)
AST SERPL-CCNC: 19 U/L (ref 15–37)
BACTERIA URNS QL MICRO: ABNORMAL /HPF
BASOPHILS # BLD: 0.1 K/UL (ref 0–0.1)
BASOPHILS NFR BLD: 0 % (ref 0–1)
BILIRUB SERPL-MCNC: 0.2 MG/DL (ref 0.2–1)
BILIRUB SERPL-MCNC: 0.3 MG/DL (ref 0.2–1)
BILIRUB UR QL: NEGATIVE
BUN SERPL-MCNC: 14 MG/DL (ref 6–20)
BUN SERPL-MCNC: 15 MG/DL (ref 6–20)
BUN/CREAT SERPL: 19 (ref 12–20)
BUN/CREAT SERPL: 20 (ref 12–20)
CALCIUM SERPL-MCNC: 9.7 MG/DL (ref 8.5–10.1)
CALCIUM SERPL-MCNC: 9.8 MG/DL (ref 8.5–10.1)
CHLORIDE SERPL-SCNC: 103 MMOL/L (ref 97–108)
CHLORIDE SERPL-SCNC: 104 MMOL/L (ref 97–108)
CO2 SERPL-SCNC: 28 MMOL/L (ref 21–32)
CO2 SERPL-SCNC: 29 MMOL/L (ref 21–32)
COLOR UR: ABNORMAL
CREAT SERPL-MCNC: 0.72 MG/DL (ref 0.55–1.02)
CREAT SERPL-MCNC: 0.74 MG/DL (ref 0.55–1.02)
DIFFERENTIAL METHOD BLD: ABNORMAL
EOSINOPHIL # BLD: 0.3 K/UL (ref 0–0.4)
EOSINOPHIL NFR BLD: 2 % (ref 0–7)
EPITH CASTS URNS QL MICRO: ABNORMAL /LPF
ERYTHROCYTE [DISTWIDTH] IN BLOOD BY AUTOMATED COUNT: 13.1 % (ref 11.5–14.5)
GLOBULIN SER CALC-MCNC: 3.9 G/DL (ref 2–4)
GLOBULIN SER CALC-MCNC: 4.1 G/DL (ref 2–4)
GLUCOSE SERPL-MCNC: 102 MG/DL (ref 65–100)
GLUCOSE SERPL-MCNC: 115 MG/DL (ref 65–100)
GLUCOSE UR STRIP.AUTO-MCNC: NEGATIVE MG/DL
HCT VFR BLD AUTO: 40.7 % (ref 35–47)
HGB BLD-MCNC: 14 G/DL (ref 11.5–16)
HGB UR QL STRIP: ABNORMAL
IMM GRANULOCYTES # BLD: 0 K/UL (ref 0–0.04)
IMM GRANULOCYTES NFR BLD AUTO: 0 % (ref 0–0.5)
KETONES UR QL STRIP.AUTO: NEGATIVE MG/DL
LEUKOCYTE ESTERASE UR QL STRIP.AUTO: ABNORMAL
LYMPHOCYTES # BLD: 3.7 K/UL (ref 0.8–3.5)
LYMPHOCYTES NFR BLD: 31 % (ref 12–49)
MCH RBC QN AUTO: 30.1 PG (ref 26–34)
MCHC RBC AUTO-ENTMCNC: 34.4 G/DL (ref 30–36.5)
MCV RBC AUTO: 87.5 FL (ref 80–99)
MONOCYTES # BLD: 1.1 K/UL (ref 0–1)
MONOCYTES NFR BLD: 9 % (ref 5–13)
NEUTS SEG # BLD: 7 K/UL (ref 1.8–8)
NEUTS SEG NFR BLD: 57 % (ref 32–75)
NITRITE UR QL STRIP.AUTO: POSITIVE
NRBC # BLD: 0 K/UL (ref 0–0.01)
NRBC BLD-RTO: 0 PER 100 WBC
PH UR STRIP: 6.5 [PH] (ref 5–8)
PLATELET # BLD AUTO: 284 K/UL (ref 150–400)
PMV BLD AUTO: 10.3 FL (ref 8.9–12.9)
POTASSIUM SERPL-SCNC: 3.5 MMOL/L (ref 3.5–5.1)
POTASSIUM SERPL-SCNC: 3.8 MMOL/L (ref 3.5–5.1)
PROT SERPL-MCNC: 7.3 G/DL (ref 6.4–8.2)
PROT SERPL-MCNC: 7.3 G/DL (ref 6.4–8.2)
PROT UR STRIP-MCNC: 100 MG/DL
RBC # BLD AUTO: 4.65 M/UL (ref 3.8–5.2)
RBC #/AREA URNS HPF: ABNORMAL /HPF (ref 0–5)
SODIUM SERPL-SCNC: 141 MMOL/L (ref 136–145)
SODIUM SERPL-SCNC: 141 MMOL/L (ref 136–145)
SP GR UR REFRACTOMETRY: 1.02 (ref 1–1.03)
UROBILINOGEN UR QL STRIP.AUTO: 1 EU/DL (ref 0.2–1)
WBC # BLD AUTO: 12.2 K/UL (ref 3.6–11)
WBC URNS QL MICRO: ABNORMAL /HPF (ref 0–4)

## 2018-03-04 PROCEDURE — 74011250636 HC RX REV CODE- 250/636: Performed by: PHYSICIAN ASSISTANT

## 2018-03-04 PROCEDURE — 80053 COMPREHEN METABOLIC PANEL: CPT | Performed by: PHYSICIAN ASSISTANT

## 2018-03-04 PROCEDURE — 81001 URINALYSIS AUTO W/SCOPE: CPT | Performed by: EMERGENCY MEDICINE

## 2018-03-04 PROCEDURE — 74176 CT ABD & PELVIS W/O CONTRAST: CPT

## 2018-03-04 PROCEDURE — 96372 THER/PROPH/DIAG INJ SC/IM: CPT

## 2018-03-04 PROCEDURE — 74011000258 HC RX REV CODE- 258: Performed by: PHYSICIAN ASSISTANT

## 2018-03-04 PROCEDURE — 96365 THER/PROPH/DIAG IV INF INIT: CPT

## 2018-03-04 PROCEDURE — 99283 EMERGENCY DEPT VISIT LOW MDM: CPT

## 2018-03-04 PROCEDURE — 74011250637 HC RX REV CODE- 250/637: Performed by: PHYSICIAN ASSISTANT

## 2018-03-04 PROCEDURE — 85025 COMPLETE CBC W/AUTO DIFF WBC: CPT | Performed by: PHYSICIAN ASSISTANT

## 2018-03-04 PROCEDURE — 36415 COLL VENOUS BLD VENIPUNCTURE: CPT | Performed by: PHYSICIAN ASSISTANT

## 2018-03-04 RX ORDER — SODIUM CHLORIDE 0.9 % (FLUSH) 0.9 %
5-10 SYRINGE (ML) INJECTION EVERY 8 HOURS
Status: DISCONTINUED | OUTPATIENT
Start: 2018-03-04 | End: 2018-03-05 | Stop reason: HOSPADM

## 2018-03-04 RX ORDER — SODIUM CHLORIDE 0.9 % (FLUSH) 0.9 %
5-10 SYRINGE (ML) INJECTION AS NEEDED
Status: DISCONTINUED | OUTPATIENT
Start: 2018-03-04 | End: 2018-03-05 | Stop reason: HOSPADM

## 2018-03-04 RX ORDER — KETOROLAC TROMETHAMINE 30 MG/ML
30 INJECTION, SOLUTION INTRAMUSCULAR; INTRAVENOUS
Status: DISCONTINUED | OUTPATIENT
Start: 2018-03-04 | End: 2018-03-04

## 2018-03-04 RX ORDER — HYDROCODONE BITARTRATE AND ACETAMINOPHEN 5; 325 MG/1; MG/1
1 TABLET ORAL
Status: COMPLETED | OUTPATIENT
Start: 2018-03-04 | End: 2018-03-04

## 2018-03-04 RX ORDER — KETOROLAC TROMETHAMINE 30 MG/ML
30 INJECTION, SOLUTION INTRAMUSCULAR; INTRAVENOUS
Status: COMPLETED | OUTPATIENT
Start: 2018-03-04 | End: 2018-03-04

## 2018-03-04 RX ADMIN — HYDROCODONE BITARTRATE AND ACETAMINOPHEN 1 TABLET: 5; 325 TABLET ORAL at 21:59

## 2018-03-04 RX ADMIN — CEFTRIAXONE 1 G: 1 INJECTION, POWDER, FOR SOLUTION INTRAMUSCULAR; INTRAVENOUS at 23:52

## 2018-03-04 RX ADMIN — KETOROLAC TROMETHAMINE 30 MG: 30 INJECTION, SOLUTION INTRAMUSCULAR; INTRAVENOUS at 22:44

## 2018-03-05 RX ORDER — CEPHALEXIN 500 MG/1
500 CAPSULE ORAL 2 TIMES DAILY
Qty: 14 CAP | Refills: 0 | Status: SHIPPED | OUTPATIENT
Start: 2018-03-05 | End: 2018-03-12

## 2018-03-05 RX ORDER — NAPROXEN 500 MG/1
500 TABLET ORAL 2 TIMES DAILY WITH MEALS
Qty: 20 TAB | Refills: 0 | Status: SHIPPED | OUTPATIENT
Start: 2018-03-05 | End: 2018-03-20

## 2018-03-05 NOTE — DISCHARGE INSTRUCTIONS

## 2018-03-05 NOTE — ED PROVIDER NOTES
EMERGENCY DEPARTMENT HISTORY AND PHYSICAL EXAM      Date: 3/4/2018  Patient Name: Agustin Serrano    History of Presenting Illness     Chief Complaint   Patient presents with    Flank Pain     patient reports to ed with rightside pain x \"a couple of hours ago\"       History Provided By: Patient    HPI: Agustin Serrano, 36 y.o. female with PMHx significant for chronic back pain, HTN, depression, presents ambulatory to the ED with cc of sudden onset constant R flank pain radiating into RLQ that began a couple of hours ago. Pt describes her pain as sharp and is worse with movement. She reports associated nausea and 2 episodes of vomiting (most recent episode 1 hour ago). Pt endorses a hx of kidney stones, but states her pain is worse. Additionally, she notes a family hx of appendicitis. Pt further adds prior cholecystectomy, tubal ligation, and  x 3. She denies hx of ectopic pregnancy. Pt specifically denies any recent fevers, chills, hematuria, dysuria, diarrhea, cough, CP, or SOB. PCP: None    There are no other complaints, changes, or physical findings at this time. Current Facility-Administered Medications   Medication Dose Route Frequency Provider Last Rate Last Dose    sodium chloride (NS) flush 5-10 mL  5-10 mL IntraVENous Q8H Jill Mora [place]        sodium chloride (NS) flush 5-10 mL  5-10 mL IntraVENous PRN Jill Mora PA         Current Outpatient Prescriptions   Medication Sig Dispense Refill    cephALEXin (KEFLEX) 500 mg capsule Take 1 Cap by mouth two (2) times a day for 7 days. 14 Cap 0    naproxen (NAPROSYN) 500 mg tablet Take 1 Tab by mouth two (2) times daily (with meals). 20 Tab 0    venlafaxine-SR (EFFEXOR-XR) 75 mg capsule Take 1 Cap by mouth daily.  30 Cap 2    lisinopril-hydroCHLOROthiazide (PRINZIDE, ZESTORETIC) 20-25 mg per tablet take 1 tablet by mouth once daily  1    ALPRAZolam (XANAX) 1 mg tablet take 1 tablet by mouth twice a day 60 Tab 2    zolpidem (AMBIEN) 5 mg tablet take 1 tablet by mouth at bedtime 30 Tab 1       Past History     Past Medical History:  Past Medical History:   Diagnosis Date    Hx MRSA infection     Hypertension     Infectious disease     MRSA    Neurological disorder     right wrist carpal tunnel    Other ill-defined conditions(799.89)     Chronic Back pain     Psychiatric disorder     depression       Past Surgical History:  Past Surgical History:   Procedure Laterality Date    HX CARPAL TUNNEL RELEASE      right wrist    HX  SECTION      x3    HX CHOLECYSTECTOMY      HX HERNIA REPAIR      3/2011       Family History:  Family History   Problem Relation Age of Onset    Lung Disease Mother      sarcoidosis    Diabetes Father     Kidney Disease Father     Pacemaker Father        Social History:  Social History   Substance Use Topics    Smoking status: Current Every Day Smoker     Packs/day: 1.50    Smokeless tobacco: Never Used    Alcohol use Yes      Comment: Occasionlly - holidays       Allergies: Allergies   Allergen Reactions    Norvasc [Amlodipine] Other (comments)     Ankle swelling         Review of Systems   Review of Systems   Constitutional: Negative for chills and fever. HENT: Negative. Cardiovascular: Negative. Gastrointestinal: Positive for abdominal pain (RLQ), nausea and vomiting. Negative for diarrhea. Genitourinary: Positive for flank pain (R). Negative for difficulty urinating and dysuria. Musculoskeletal: Negative for arthralgias and myalgias. Neurological: Negative. All other systems reviewed and are negative. Physical Exam   Physical Exam   Constitutional: She is oriented to person, place, and time. She appears well-developed and well-nourished. No distress. HENT:   Head: Normocephalic and atraumatic. Eyes: Conjunctivae are normal.   Cardiovascular: Normal rate, regular rhythm and normal heart sounds.     Pulmonary/Chest: Effort normal and breath sounds normal. No respiratory distress. She has no wheezes. She has no rales. Abdominal: Soft. Normal appearance and bowel sounds are normal. She exhibits no distension. There is tenderness in the left lower quadrant. There is CVA tenderness (right side). There is no rebound, no tenderness at McBurney's point and negative Thayer's sign. No hernia. Musculoskeletal: Normal range of motion. Neurological: She is alert and oriented to person, place, and time. Skin: Skin is warm. No rash noted. Psychiatric: She has a normal mood and affect. Her behavior is normal.   Nursing note and vitals reviewed. Diagnostic Study Results     Labs -     Recent Results (from the past 12 hour(s))   URINALYSIS W/ RFLX MICROSCOPIC    Collection Time: 03/04/18 10:09 PM   Result Value Ref Range    Color YELLOW/STRAW      Appearance CLOUDY (A) CLEAR      Specific gravity 1.020 1.003 - 1.030      pH (UA) 6.5 5.0 - 8.0      Protein 100 (A) NEG mg/dL    Glucose NEGATIVE  NEG mg/dL    Ketone NEGATIVE  NEG mg/dL    Bilirubin NEGATIVE  NEG      Blood LARGE (A) NEG      Urobilinogen 1.0 0.2 - 1.0 EU/dL    Nitrites POSITIVE (A) NEG      Leukocyte Esterase SMALL (A) NEG     METABOLIC PANEL, COMPREHENSIVE    Collection Time: 03/04/18 10:09 PM   Result Value Ref Range    Sodium 141 136 - 145 mmol/L    Potassium 3.8 3.5 - 5.1 mmol/L    Chloride 103 97 - 108 mmol/L    CO2 29 21 - 32 mmol/L    Anion gap 9 5 - 15 mmol/L    Glucose 115 (H) 65 - 100 mg/dL    BUN 14 6 - 20 MG/DL    Creatinine 0.72 0.55 - 1.02 MG/DL    BUN/Creatinine ratio 19 12 - 20      GFR est AA >60 >60 ml/min/1.73m2    GFR est non-AA >60 >60 ml/min/1.73m2    Calcium 9.8 8.5 - 10.1 MG/DL    Bilirubin, total 0.3 0.2 - 1.0 MG/DL    ALT (SGPT) 28 12 - 78 U/L    AST (SGOT) 19 15 - 37 U/L    Alk.  phosphatase 95 45 - 117 U/L    Protein, total 7.3 6.4 - 8.2 g/dL    Albumin 3.2 (L) 3.5 - 5.0 g/dL    Globulin 4.1 (H) 2.0 - 4.0 g/dL    A-G Ratio 0.8 (L) 1.1 - 2.2     URINE MICROSCOPIC ONLY Collection Time: 03/04/18 10:09 PM   Result Value Ref Range    WBC 5-10 0 - 4 /hpf    RBC 20-50 0 - 5 /hpf    Epithelial cells FEW FEW /lpf    Bacteria 4+ (A) NEG /hpf   CBC WITH AUTOMATED DIFF    Collection Time: 03/04/18 10:59 PM   Result Value Ref Range    WBC 12.2 (H) 3.6 - 11.0 K/uL    RBC 4.65 3.80 - 5.20 M/uL    HGB 14.0 11.5 - 16.0 g/dL    HCT 40.7 35.0 - 47.0 %    MCV 87.5 80.0 - 99.0 FL    MCH 30.1 26.0 - 34.0 PG    MCHC 34.4 30.0 - 36.5 g/dL    RDW 13.1 11.5 - 14.5 %    PLATELET 354 163 - 379 K/uL    MPV 10.3 8.9 - 12.9 FL    NRBC 0.0 0  WBC    ABSOLUTE NRBC 0.00 0.00 - 0.01 K/uL    NEUTROPHILS 57 32 - 75 %    LYMPHOCYTES 31 12 - 49 %    MONOCYTES 9 5 - 13 %    EOSINOPHILS 2 0 - 7 %    BASOPHILS 0 0 - 1 %    IMMATURE GRANULOCYTES 0 0.0 - 0.5 %    ABS. NEUTROPHILS 7.0 1.8 - 8.0 K/UL    ABS. LYMPHOCYTES 3.7 (H) 0.8 - 3.5 K/UL    ABS. MONOCYTES 1.1 (H) 0.0 - 1.0 K/UL    ABS. EOSINOPHILS 0.3 0.0 - 0.4 K/UL    ABS. BASOPHILS 0.1 0.0 - 0.1 K/UL    ABS. IMM. GRANS. 0.0 0.00 - 0.04 K/UL    DF AUTOMATED     METABOLIC PANEL, COMPREHENSIVE    Collection Time: 03/04/18 10:59 PM   Result Value Ref Range    Sodium 141 136 - 145 mmol/L    Potassium 3.5 3.5 - 5.1 mmol/L    Chloride 104 97 - 108 mmol/L    CO2 28 21 - 32 mmol/L    Anion gap 9 5 - 15 mmol/L    Glucose 102 (H) 65 - 100 mg/dL    BUN 15 6 - 20 MG/DL    Creatinine 0.74 0.55 - 1.02 MG/DL    BUN/Creatinine ratio 20 12 - 20      GFR est AA >60 >60 ml/min/1.73m2    GFR est non-AA >60 >60 ml/min/1.73m2    Calcium 9.7 8.5 - 10.1 MG/DL    Bilirubin, total 0.2 0.2 - 1.0 MG/DL    ALT (SGPT) 27 12 - 78 U/L    AST (SGOT) 13 (L) 15 - 37 U/L    Alk.  phosphatase 96 45 - 117 U/L    Protein, total 7.3 6.4 - 8.2 g/dL    Albumin 3.4 (L) 3.5 - 5.0 g/dL    Globulin 3.9 2.0 - 4.0 g/dL    A-G Ratio 0.9 (L) 1.1 - 2.2         Radiologic Studies -   CT ABD PELV WO CONT   Final Result        CT Results  (Last 48 hours)               03/04/18 2231  CT ABD PELV WO CONT Final result Impression:  IMPRESSION:    There is no acute abnormality in the abdomen or pelvis. Narrative:  EXAM:  CT ABD PELV WO CONT       INDICATION: Acute right flank pain. COMPARISON: None. TECHNIQUE: Helical CT of the abdomen  and pelvis  without contrast. Coronal and   sagittal reformats are performed. CT dose reduction was achieved through use of   a standardized protocol tailored for this examination and automatic exposure   control for dose modulation. FINDINGS:    Solid organ evaluation is limited without contrast.        The visualized lung bases demonstrate no mass or consolidation. The heart size   is normal. There is no pericardial or pleural effusion. There is no renal, ureteral, or bladder calculus. The kidneys are symmetric   without hydronephrosis. There is no perinephric fluid or fat stranding. The liver, spleen, pancreas, and adrenal glands are normal.  The gall bladder is   surgically absent without intra- or extra-hepatic biliary dilatation. There are no dilated bowel loops. The appendix is normal.  There are no   enlarged lymph nodes. There is no free fluid or free air. The aorta tapers   without aneurysm. There is no pelvic mass. The bony structures are age-appropriate. CXR Results  (Last 48 hours)    None            Medical Decision Making   I am the first provider for this patient. I reviewed the vital signs, available nursing notes, past medical history, past surgical history, family history and social history. Vital Signs-Reviewed the patient's vital signs. Patient Vitals for the past 12 hrs:   Temp Pulse Resp BP SpO2   03/04/18 2051 98.7 °F (37.1 °C) 99 20 126/70 97 %         Records Reviewed: Nursing Notes and Old Medical Records    Provider Notes (Medical Decision Making):   DDx: Kidney stone, pyelo, uti, appendicitis       ED Course:   Initial assessment performed.  The patients presenting problems have been discussed, and they are in agreement with the care plan formulated and outlined with them. I have encouraged them to ask questions as they arise throughout their visit. Pt reports improvement of sx with toradol and norco.     Disposition:  12:32 AM  Discussed lab and imaging results with pt along with dx and treatment plan. Discussed importance of  PCP follow up. All questions answered. Pt voiced they understood. Return if sx worsen. PLAN:  1. Current Discharge Medication List      START taking these medications    Details   cephALEXin (KEFLEX) 500 mg capsule Take 1 Cap by mouth two (2) times a day for 7 days. Qty: 14 Cap, Refills: 0      naproxen (NAPROSYN) 500 mg tablet Take 1 Tab by mouth two (2) times daily (with meals). Qty: 20 Tab, Refills: 0           2. Follow-up Information     Follow up With Details Comments Contact Info    [facility] Schedule an appointment as soon as possible for a visit As needed [address] [place] [address]  [phone]        Return to ED if worse     Diagnosis     Clinical Impression:   1. Acute cystitis without hematuria        Attestations: This note is prepared by Tracie Ramos, acting as Scribe for Rakan Reyes. The scribe's documentation has been prepared under my direction and personally reviewed by me in its entirety. I confirm that the note above accurately reflects all work, treatment, procedures, and medical decision making performed by me.   RONEN Reyes

## 2018-03-05 NOTE — ED NOTES
Discharge instructions were given to the patient by provider and Jimena Obrien RN. The patient left the Emergency Department ambulatory, alert and oriented and in no acute distress with 2 prescriptions. The patient was given extensive education regarding medications, proper fluids to drink, and how to prevent future UTIs. The patient was encouraged to call or return to the ED for worsening issues or problems and was encouraged to schedule a follow up appointment for continuing care. The patient verbalized understanding of discharge instructions and prescriptions, all questions were answered. The patient has no further concerns at this time.

## 2018-03-05 NOTE — ED NOTES
This RN assumes role as preceptor to Thuy Maxwell RN. This RN reviews all assessments, charting, medication administration, and skills performed by the preceptee.

## 2018-03-05 NOTE — ED NOTES
Pt presents to ED ambulatory complaining of right flank pain. Pt is alert and oriented x 4, RR even and unlabored, skin is warm and dry. Assessment completed and pt updated on plan of care. Emergency Department Nursing Plan of Care       The Nursing Plan of Care is developed from the Nursing assessment and Emergency Department Attending provider initial evaluation. The plan of care may be reviewed in the ED Provider note.     The Plan of Care was developed with the following considerations:   Patient / Family readiness to learn indicated by:verbalized understanding  Persons(s) to be included in education: patient  Barriers to Learning/Limitations:No    Signed     Ksenia Matthews RN    3/4/2018   11:08 PM

## 2018-03-06 ENCOUNTER — HOSPITAL ENCOUNTER (EMERGENCY)
Age: 43
Discharge: HOME | End: 2018-03-06
Attending: EMERGENCY MEDICINE
Payer: MEDICARE

## 2018-03-06 VITALS
DIASTOLIC BLOOD PRESSURE: 65 MMHG | WEIGHT: 270 LBS | RESPIRATION RATE: 16 BRPM | BODY MASS INDEX: 46.1 KG/M2 | HEIGHT: 64 IN | HEART RATE: 67 BPM | TEMPERATURE: 98.5 F | OXYGEN SATURATION: 99 % | SYSTOLIC BLOOD PRESSURE: 157 MMHG

## 2018-03-06 DIAGNOSIS — A59.01 TRICHOMONAS VAGINITIS: Primary | ICD-10-CM

## 2018-03-06 DIAGNOSIS — R31.9 URINARY TRACT INFECTION WITH HEMATURIA, SITE UNSPECIFIED: ICD-10-CM

## 2018-03-06 DIAGNOSIS — N39.0 URINARY TRACT INFECTION WITH HEMATURIA, SITE UNSPECIFIED: ICD-10-CM

## 2018-03-06 DIAGNOSIS — N73.0 PID (ACUTE PELVIC INFLAMMATORY DISEASE): ICD-10-CM

## 2018-03-06 LAB
ALBUMIN SERPL-MCNC: 3.7 G/DL (ref 3.5–5)
ALBUMIN/GLOB SERPL: 0.9 {RATIO} (ref 1.1–2.2)
ALP SERPL-CCNC: 96 U/L (ref 45–117)
ALT SERPL-CCNC: 25 U/L (ref 12–78)
ANION GAP SERPL CALC-SCNC: 9 MMOL/L (ref 5–15)
APPEARANCE UR: ABNORMAL
AST SERPL-CCNC: 13 U/L (ref 15–37)
BACTERIA URNS QL MICRO: NEGATIVE /HPF
BASOPHILS # BLD: 0 K/UL (ref 0–0.1)
BASOPHILS NFR BLD: 1 % (ref 0–1)
BILIRUB SERPL-MCNC: 0.2 MG/DL (ref 0.2–1)
BILIRUB UR QL: NEGATIVE
BUN SERPL-MCNC: 13 MG/DL (ref 6–20)
BUN/CREAT SERPL: 17 (ref 12–20)
CALCIUM SERPL-MCNC: 9.4 MG/DL (ref 8.5–10.1)
CHLORIDE SERPL-SCNC: 105 MMOL/L (ref 97–108)
CO2 SERPL-SCNC: 28 MMOL/L (ref 21–32)
COLOR UR: ABNORMAL
CREAT SERPL-MCNC: 0.76 MG/DL (ref 0.55–1.02)
DIFFERENTIAL METHOD BLD: ABNORMAL
EOSINOPHIL # BLD: 0.3 K/UL (ref 0–0.4)
EOSINOPHIL NFR BLD: 4 % (ref 0–7)
EPITH CASTS URNS QL MICRO: ABNORMAL /LPF
ERYTHROCYTE [DISTWIDTH] IN BLOOD BY AUTOMATED COUNT: 13.2 % (ref 11.5–14.5)
GLOBULIN SER CALC-MCNC: 3.9 G/DL (ref 2–4)
GLUCOSE SERPL-MCNC: 108 MG/DL (ref 65–100)
GLUCOSE UR STRIP.AUTO-MCNC: NEGATIVE MG/DL
HCG UR QL: NEGATIVE
HCT VFR BLD AUTO: 41.5 % (ref 35–47)
HGB BLD-MCNC: 14 G/DL (ref 11.5–16)
HGB UR QL STRIP: ABNORMAL
IMM GRANULOCYTES # BLD: 0.1 K/UL (ref 0–0.04)
IMM GRANULOCYTES NFR BLD AUTO: 1 % (ref 0–0.5)
KETONES UR QL STRIP.AUTO: NEGATIVE MG/DL
LEUKOCYTE ESTERASE UR QL STRIP.AUTO: ABNORMAL
LYMPHOCYTES # BLD: 2.7 K/UL (ref 0.8–3.5)
LYMPHOCYTES NFR BLD: 33 % (ref 12–49)
MCH RBC QN AUTO: 29.9 PG (ref 26–34)
MCHC RBC AUTO-ENTMCNC: 33.7 G/DL (ref 30–36.5)
MCV RBC AUTO: 88.5 FL (ref 80–99)
MONOCYTES # BLD: 0.6 K/UL (ref 0–1)
MONOCYTES NFR BLD: 7 % (ref 5–13)
NEUTS SEG # BLD: 4.5 K/UL (ref 1.8–8)
NEUTS SEG NFR BLD: 55 % (ref 32–75)
NITRITE UR QL STRIP.AUTO: NEGATIVE
NRBC # BLD: 0 K/UL (ref 0–0.01)
NRBC BLD-RTO: 0 PER 100 WBC
PH UR STRIP: 6 [PH] (ref 5–8)
PLATELET # BLD AUTO: 269 K/UL (ref 150–400)
PMV BLD AUTO: 10.2 FL (ref 8.9–12.9)
POTASSIUM SERPL-SCNC: 3.6 MMOL/L (ref 3.5–5.1)
PROT SERPL-MCNC: 7.6 G/DL (ref 6.4–8.2)
PROT UR STRIP-MCNC: NEGATIVE MG/DL
RBC # BLD AUTO: 4.69 M/UL (ref 3.8–5.2)
RBC #/AREA URNS HPF: ABNORMAL /HPF (ref 0–5)
SODIUM SERPL-SCNC: 142 MMOL/L (ref 136–145)
SP GR UR REFRACTOMETRY: 1.02 (ref 1–1.03)
TRICHOMONAS UR QL MICRO: PRESENT
UA: UC IF INDICATED,UAUC: ABNORMAL
UROBILINOGEN UR QL STRIP.AUTO: 0.2 EU/DL (ref 0.2–1)
WBC # BLD AUTO: 8.3 K/UL (ref 3.6–11)
WBC URNS QL MICRO: ABNORMAL /HPF (ref 0–4)

## 2018-03-06 PROCEDURE — 87086 URINE CULTURE/COLONY COUNT: CPT | Performed by: PHYSICIAN ASSISTANT

## 2018-03-06 PROCEDURE — 87491 CHLMYD TRACH DNA AMP PROBE: CPT | Performed by: PHYSICIAN ASSISTANT

## 2018-03-06 PROCEDURE — 81001 URINALYSIS AUTO W/SCOPE: CPT | Performed by: PHYSICIAN ASSISTANT

## 2018-03-06 PROCEDURE — 74011250637 HC RX REV CODE- 250/637: Performed by: PHYSICIAN ASSISTANT

## 2018-03-06 PROCEDURE — 96372 THER/PROPH/DIAG INJ SC/IM: CPT

## 2018-03-06 PROCEDURE — 36415 COLL VENOUS BLD VENIPUNCTURE: CPT | Performed by: PHYSICIAN ASSISTANT

## 2018-03-06 PROCEDURE — 85025 COMPLETE CBC W/AUTO DIFF WBC: CPT | Performed by: PHYSICIAN ASSISTANT

## 2018-03-06 PROCEDURE — 80053 COMPREHEN METABOLIC PANEL: CPT | Performed by: PHYSICIAN ASSISTANT

## 2018-03-06 PROCEDURE — 96361 HYDRATE IV INFUSION ADD-ON: CPT

## 2018-03-06 PROCEDURE — 74011000250 HC RX REV CODE- 250: Performed by: PHYSICIAN ASSISTANT

## 2018-03-06 PROCEDURE — 81025 URINE PREGNANCY TEST: CPT

## 2018-03-06 PROCEDURE — 74011250636 HC RX REV CODE- 250/636: Performed by: PHYSICIAN ASSISTANT

## 2018-03-06 PROCEDURE — 99284 EMERGENCY DEPT VISIT MOD MDM: CPT

## 2018-03-06 PROCEDURE — 96374 THER/PROPH/DIAG INJ IV PUSH: CPT

## 2018-03-06 RX ORDER — MORPHINE SULFATE 2 MG/ML
1 INJECTION, SOLUTION INTRAMUSCULAR; INTRAVENOUS
Status: DISCONTINUED | OUTPATIENT
Start: 2018-03-06 | End: 2018-03-06

## 2018-03-06 RX ORDER — METRONIDAZOLE 500 MG/1
2000 TABLET ORAL
Qty: 4 TAB | Refills: 0 | Status: SHIPPED | OUTPATIENT
Start: 2018-03-06 | End: 2018-03-06

## 2018-03-06 RX ORDER — ONDANSETRON 4 MG/1
4 TABLET, ORALLY DISINTEGRATING ORAL
Status: COMPLETED | OUTPATIENT
Start: 2018-03-06 | End: 2018-03-06

## 2018-03-06 RX ORDER — AZITHROMYCIN 250 MG/1
1000 TABLET, FILM COATED ORAL
Status: COMPLETED | OUTPATIENT
Start: 2018-03-06 | End: 2018-03-06

## 2018-03-06 RX ORDER — SODIUM CHLORIDE 0.9 % (FLUSH) 0.9 %
5-10 SYRINGE (ML) INJECTION EVERY 8 HOURS
Status: DISCONTINUED | OUTPATIENT
Start: 2018-03-06 | End: 2018-03-06 | Stop reason: HOSPADM

## 2018-03-06 RX ORDER — SODIUM CHLORIDE 0.9 % (FLUSH) 0.9 %
5-10 SYRINGE (ML) INJECTION AS NEEDED
Status: DISCONTINUED | OUTPATIENT
Start: 2018-03-06 | End: 2018-03-06 | Stop reason: HOSPADM

## 2018-03-06 RX ORDER — KETOROLAC TROMETHAMINE 30 MG/ML
15 INJECTION, SOLUTION INTRAMUSCULAR; INTRAVENOUS
Status: COMPLETED | OUTPATIENT
Start: 2018-03-06 | End: 2018-03-06

## 2018-03-06 RX ORDER — HYDROCODONE BITARTRATE AND ACETAMINOPHEN 5; 325 MG/1; MG/1
1 TABLET ORAL
Status: COMPLETED | OUTPATIENT
Start: 2018-03-06 | End: 2018-03-06

## 2018-03-06 RX ORDER — DOXYCYCLINE 100 MG/1
100 CAPSULE ORAL 2 TIMES DAILY
Qty: 28 CAP | Refills: 0 | Status: SHIPPED | OUTPATIENT
Start: 2018-03-06 | End: 2018-03-20

## 2018-03-06 RX ADMIN — SODIUM CHLORIDE 1000 ML: 900 INJECTION, SOLUTION INTRAVENOUS at 12:06

## 2018-03-06 RX ADMIN — HYDROCODONE BITARTRATE AND ACETAMINOPHEN 1 TABLET: 5; 325 TABLET ORAL at 13:00

## 2018-03-06 RX ADMIN — AZITHROMYCIN 1000 MG: 250 TABLET, FILM COATED ORAL at 13:01

## 2018-03-06 RX ADMIN — KETOROLAC TROMETHAMINE 15 MG: 30 INJECTION, SOLUTION INTRAMUSCULAR; INTRAVENOUS at 12:09

## 2018-03-06 RX ADMIN — LIDOCAINE HYDROCHLORIDE 250 MG: 10 INJECTION, SOLUTION EPIDURAL; INFILTRATION; INTRACAUDAL; PERINEURAL at 12:56

## 2018-03-06 RX ADMIN — ONDANSETRON 4 MG: 4 TABLET, ORALLY DISINTEGRATING ORAL at 12:06

## 2018-03-06 NOTE — ED PROVIDER NOTES
EMERGENCY DEPARTMENT HISTORY AND PHYSICAL EXAM    Date: 3/6/2018  Patient Name: Altaf Cobb    History of Presenting Illness     Chief Complaint   Patient presents with    Flank Pain         History Provided By: Patient      HPI: Altaf Cobb is a 36 y.o. female with a PMH of hypertension and depresion, MRSA, anxiety attacks who presents with gradual onset constant worsening aching bilateral flank pain x 2 days. Pain 8/10. Pt seen here for Rt flank pain on 3/4/2018. Symptoms worsening and migrating to Lt flank today. Pt taking keflex and naproxen for uti as prescribed w/o relief. +chills, n/v (last emeiss just pta). LMP ended 1 week pta. Denies fever, urinary sxs, hematuria, cp, sob, cough, constipation, diarrhea, vaginal discharge/ sxs. PCP: None    Current Facility-Administered Medications   Medication Dose Route Frequency Provider Last Rate Last Dose    sodium chloride 0.9 % bolus infusion 1,000 mL  1,000 mL IntraVENous ONCE Lisseth Noel PA-C 1,000 mL/hr at 03/06/18 1206 1,000 mL at 03/06/18 1206    sodium chloride (NS) flush 5-10 mL  5-10 mL IntraVENous Q8H Lisseth Noel PA-C        sodium chloride (NS) flush 5-10 mL  5-10 mL IntraVENous PRN Lisseth Noel PA-C        cefTRIAXone (ROCEPHIN) 250 mg in lidocaine (PF) (XYLOCAINE) 10 mg/mL (1 %) IM injection  250 mg IntraMUSCular NOW Lisseth Noel PA-C        azithromycin [facility]) tablet 1,000 mg  1,000 mg Oral NOW Lisseth Noel PA-C        HYDROcodone-acetaminophen (NORCO) 5-325 mg per tablet 1 Tab  1 Tab Oral NOW Lisseth Noel PA-C         Current Outpatient Prescriptions   Medication Sig Dispense Refill    doxycycline (MONODOX) 100 mg capsule Take 1 Cap by mouth two (2) times a day for 14 days. 28 Cap 0    metroNIDAZOLE (FLAGYL) 500 mg tablet Take 4 Tabs by mouth now for 1 dose. Take all 4 pills one time. 4 Tab 0    cephALEXin (KEFLEX) 500 mg capsule Take 1 Cap by mouth two (2) times a day for 7 days.  14 Cap 0    naproxen (NAPROSYN) 500 mg tablet Take 1 Tab by mouth two (2) times daily (with meals). 20 Tab 0    venlafaxine-SR (EFFEXOR-XR) 75 mg capsule Take 1 Cap by mouth daily. 30 Cap 2    lisinopril-hydroCHLOROthiazide (PRINZIDE, ZESTORETIC) 20-25 mg per tablet take 1 tablet by mouth once daily  1    ALPRAZolam (XANAX) 1 mg tablet take 1 tablet by mouth twice a day 60 Tab 2    zolpidem (AMBIEN) 5 mg tablet take 1 tablet by mouth at bedtime 30 Tab 1       Past History     Past Medical History:  Past Medical History:   Diagnosis Date    Hx MRSA infection     Hypertension     Infectious disease     MRSA    Neurological disorder     right wrist carpal tunnel    Other ill-defined conditions(079.89)     Chronic Back pain     Psychiatric disorder     depression       Past Surgical History:  Past Surgical History:   Procedure Laterality Date    HX CARPAL TUNNEL RELEASE      right wrist    HX  SECTION      x3    HX CHOLECYSTECTOMY      HX HERNIA REPAIR      3/2011       Family History:  Family History   Problem Relation Age of Onset    Lung Disease Mother      sarcoidosis    Diabetes Father     Kidney Disease Father     Pacemaker Father        Social History:  Social History   Substance Use Topics    Smoking status: Current Every Day Smoker     Packs/day: 1.50    Smokeless tobacco: Never Used    Alcohol use Yes      Comment: Occasionlly - holidays       Allergies: Allergies   Allergen Reactions    Norvasc [Amlodipine] Other (comments)     Ankle swelling         Review of Systems   Review of Systems   Constitutional: Negative for activity change, chills, fatigue and fever. HENT: Negative. Respiratory: Negative. Negative for cough and shortness of breath. Cardiovascular: Negative. Negative for chest pain. Gastrointestinal: Negative for abdominal pain, constipation, diarrhea, nausea and vomiting. Genitourinary: Positive for flank pain.  Negative for difficulty urinating, dysuria, frequency, hematuria, pelvic pain, urgency, vaginal bleeding, vaginal discharge and vaginal pain. Musculoskeletal: Negative for back pain. Skin: Negative. Negative for rash. Neurological: Negative. Negative for headaches. Psychiatric/Behavioral: Negative. Physical Exam     Vitals:    03/06/18 1140   BP: 157/65   Pulse: 67   Resp: 16   Temp: 98.5 °F (36.9 °C)   SpO2: 99%   Weight: 122.5 kg (270 lb)   Height: 5' 4\" (1.626 m)     Physical Exam   Constitutional: She is oriented to person, place, and time. She appears well-developed and well-nourished. She appears distressed. HENT:   Head: Normocephalic and atraumatic. Right Ear: Hearing and external ear normal.   Left Ear: Hearing and external ear normal.   Nose: Nose normal.   Eyes: Conjunctivae and EOM are normal. Pupils are equal, round, and reactive to light. Neck: Normal range of motion. Cardiovascular: Normal rate, regular rhythm, normal heart sounds and intact distal pulses. Pulmonary/Chest: Effort normal and breath sounds normal. No respiratory distress. She has no wheezes. She has no rales. She exhibits no tenderness. Abdominal: Soft. Bowel sounds are normal. She exhibits no distension and no mass. There is generalized tenderness. There is guarding (voluntary). There is no rigidity, no rebound, no CVA tenderness, no tenderness at McBurney's point and negative Thayer's sign. Obese abd. Musculoskeletal: Normal range of motion. Neurological: She is alert and oriented to person, place, and time. Skin: Skin is warm and dry. She is not diaphoretic. Psychiatric: She has a normal mood and affect. Her behavior is normal. Judgment and thought content normal.   Nursing note and vitals reviewed.         Diagnostic Study Results     Labs -     Recent Results (from the past 12 hour(s))   URINALYSIS W/ REFLEX CULTURE    Collection Time: 03/06/18 11:59 AM   Result Value Ref Range    Color YELLOW/STRAW      Appearance CLOUDY (A) CLEAR      Specific gravity 1.020 1.003 - 1.030      pH (UA) 6.0 5.0 - 8.0      Protein NEGATIVE  NEG mg/dL    Glucose NEGATIVE  NEG mg/dL    Ketone NEGATIVE  NEG mg/dL    Bilirubin NEGATIVE  NEG      Blood SMALL (A) NEG      Urobilinogen 0.2 0.2 - 1.0 EU/dL    Nitrites NEGATIVE  NEG      Leukocyte Esterase SMALL (A) NEG      WBC 5-10 0 - 4 /hpf    RBC 0-5 0 - 5 /hpf    Epithelial cells FEW FEW /lpf    Bacteria NEGATIVE  NEG /hpf    UA:UC IF INDICATED URINE CULTURE ORDERED (A) CNI      Trichomonas PRESENT (A) NEG     CBC WITH AUTOMATED DIFF    Collection Time: 03/06/18 11:59 AM   Result Value Ref Range    WBC 8.3 3.6 - 11.0 K/uL    RBC 4.69 3.80 - 5.20 M/uL    HGB 14.0 11.5 - 16.0 g/dL    HCT 41.5 35.0 - 47.0 %    MCV 88.5 80.0 - 99.0 FL    MCH 29.9 26.0 - 34.0 PG    MCHC 33.7 30.0 - 36.5 g/dL    RDW 13.2 11.5 - 14.5 %    PLATELET 703 251 - 920 K/uL    MPV 10.2 8.9 - 12.9 FL    NRBC 0.0 0  WBC    ABSOLUTE NRBC 0.00 0.00 - 0.01 K/uL    NEUTROPHILS 55 32 - 75 %    LYMPHOCYTES 33 12 - 49 %    MONOCYTES 7 5 - 13 %    EOSINOPHILS 4 0 - 7 %    BASOPHILS 1 0 - 1 %    IMMATURE GRANULOCYTES 1 (H) 0.0 - 0.5 %    ABS. NEUTROPHILS 4.5 1.8 - 8.0 K/UL    ABS. LYMPHOCYTES 2.7 0.8 - 3.5 K/UL    ABS. MONOCYTES 0.6 0.0 - 1.0 K/UL    ABS. EOSINOPHILS 0.3 0.0 - 0.4 K/UL    ABS. BASOPHILS 0.0 0.0 - 0.1 K/UL    ABS. IMM.  GRANS. 0.1 (H) 0.00 - 0.04 K/UL    DF AUTOMATED     METABOLIC PANEL, COMPREHENSIVE    Collection Time: 03/06/18 11:59 AM   Result Value Ref Range    Sodium 142 136 - 145 mmol/L    Potassium 3.6 3.5 - 5.1 mmol/L    Chloride 105 97 - 108 mmol/L    CO2 28 21 - 32 mmol/L    Anion gap 9 5 - 15 mmol/L    Glucose 108 (H) 65 - 100 mg/dL    BUN 13 6 - 20 MG/DL    Creatinine 0.76 0.55 - 1.02 MG/DL    BUN/Creatinine ratio 17 12 - 20      GFR est AA >60 >60 ml/min/1.73m2    GFR est non-AA >60 >60 ml/min/1.73m2    Calcium 9.4 8.5 - 10.1 MG/DL    Bilirubin, total 0.2 0.2 - 1.0 MG/DL    ALT (SGPT) 25 12 - 78 U/L    AST (SGOT) 13 (L) 15 - 37 U/L Alk. phosphatase 96 45 - 117 U/L    Protein, total 7.6 6.4 - 8.2 g/dL    Albumin 3.7 3.5 - 5.0 g/dL    Globulin 3.9 2.0 - 4.0 g/dL    A-G Ratio 0.9 (L) 1.1 - 2.2     HCG URINE, QL. - POC    Collection Time: 03/06/18 12:10 PM   Result Value Ref Range    Pregnancy test,urine (POC) NEGATIVE  NEG         Radiologic Studies -   No orders to display     CT Results  (Last 48 hours)               03/04/18 2231  CT ABD PELV WO CONT Final result    Impression:  IMPRESSION:    There is no acute abnormality in the abdomen or pelvis. Narrative:  EXAM:  CT ABD PELV WO CONT       INDICATION: Acute right flank pain. COMPARISON: None. TECHNIQUE: Helical CT of the abdomen  and pelvis  without contrast. Coronal and   sagittal reformats are performed. CT dose reduction was achieved through use of   a standardized protocol tailored for this examination and automatic exposure   control for dose modulation. FINDINGS:    Solid organ evaluation is limited without contrast.        The visualized lung bases demonstrate no mass or consolidation. The heart size   is normal. There is no pericardial or pleural effusion. There is no renal, ureteral, or bladder calculus. The kidneys are symmetric   without hydronephrosis. There is no perinephric fluid or fat stranding. The liver, spleen, pancreas, and adrenal glands are normal.  The gall bladder is   surgically absent without intra- or extra-hepatic biliary dilatation. There are no dilated bowel loops. The appendix is normal.  There are no   enlarged lymph nodes. There is no free fluid or free air. The aorta tapers   without aneurysm. There is no pelvic mass. The bony structures are age-appropriate. CXR Results  (Last 48 hours)    None            Medical Decision Making   I am the first provider for this patient.     I reviewed the vital signs, available nursing notes, past medical history, past surgical history, family history and social history. Vital Signs-Reviewed the patient's vital signs. Records Reviewed: Nursing Notes, Old Medical Records, Previous Radiology Studies and Previous Laboratory Studies    ED Course:   12:30 PM  Pt resting comfortably in room in NAD. No new symptoms or complaints at this time. Endorses nausea has subsided but pain continues. Available labs/ results reviewed with pt. Plan to tx pt empirically for PID/ sti. CT 3/4 negative for abscess/ cyst/ acute process. Disposition:    DISCHARGE NOTE:   12:40 PM      Care plan outlined and precautions discussed. Patient has no new complaints, changes, or physical findings. Results of labs were reviewed with the patient. All medications were reviewed with the patient; will d/c home with flagyl and doxy. All of pt's questions and concerns were addressed. Patient was instructed and agrees to follow up with GYN, as well as to return to the ED upon further deterioration. Patient is ready to go home. Follow-up Information     Follow up With Details Comments 98 Karly Rosen NP Schedule an appointment as soon as possible for a visit in 3 days As needed [address]      Lolly Wilson MD Schedule an appointment as soon as possible for a visit in 3 days As needed [address]  [address] [facility] [address] [place] [address],[address]      Marie Pinzon MD Schedule an appointment as soon as possible for a visit in 3 days As needed [address] [facility]  [phone]            Current Discharge Medication List      START taking these medications    Details   doxycycline (MONODOX) 100 mg capsule Take 1 Cap by mouth two (2) times a day for 14 days. Qty: 28 Cap, Refills: 0      metroNIDAZOLE (FLAGYL) 500 mg tablet Take 4 Tabs by mouth now for 1 dose. Take all 4 pills one time.   Qty: 4 Tab, Refills: 0         CONTINUE these medications which have NOT CHANGED    Details   cephALEXin (KEFLEX) 500 mg capsule Take 1 Cap by mouth two (2) times a day for 7 days. Qty: 14 Cap, Refills: 0      naproxen (NAPROSYN) 500 mg tablet Take 1 Tab by mouth two (2) times daily (with meals). Qty: 20 Tab, Refills: 0      venlafaxine-SR (EFFEXOR-XR) 75 mg capsule Take 1 Cap by mouth daily. Qty: 30 Cap, Refills: 2      lisinopril-hydroCHLOROthiazide (PRINZIDE, ZESTORETIC) 20-25 mg per tablet take 1 tablet by mouth once daily  Refills: 1      ALPRAZolam (XANAX) 1 mg tablet take 1 tablet by mouth twice a day  Qty: 60 Tab, Refills: 2      zolpidem (AMBIEN) 5 mg tablet take 1 tablet by mouth at bedtime  Qty: 30 Tab, Refills: 1             Provider Notes (Medical Decision Making):   DDx: uti, pyelo, kidney stone unlikely (Ct abd/pelv 2 days ago negative), PID, sti  Plan to repeat labs and perform urine culture for susceptibility. Analgesics and antiemetics in ED. Reassess after treatments. Procedures:  Procedures        Diagnosis     Clinical Impression:   1. Trichomonas vaginitis    2. Urinary tract infection with hematuria, site unspecified    3.  PID (acute pelvic inflammatory disease)

## 2018-03-06 NOTE — ED NOTES
Reports new onset left flank pain and nausea with one episode of vomiting today. States other symptoms remain the same from two days PTA.

## 2018-03-06 NOTE — ED NOTES
Emergency Department Nursing Plan of Care       The Nursing Plan of Care is developed from the Nursing assessment and Emergency Department Attending provider initial evaluation. The plan of care may be reviewed in the ED Provider note.     The Plan of Care was developed with the following considerations:   Patient / Family readiness to learn indicated by:verbalized understanding  Persons(s) to be included in education: patient  Barriers to Learning/Limitations:No    Signed     Kosta Medina    3/6/2018   11:45 AM

## 2018-03-06 NOTE — DISCHARGE INSTRUCTIONS
Pelvic Inflammatory Disease: Care Instructions  Your Care Instructions    Pelvic inflammatory disease, or PID, is an infection of a woman's fallopian tubes and other reproductive organs. PID is usually caused by a sexually transmitted infection (STI), such as gonorrhea or chlamydia. PID can cause scars in the fallopian tubes, making it hard for a woman to get pregnant. Having one STI increases your risk for other STIs, such as genital herpes, genital warts, syphilis, and HIV. It is important to take all the medicine that was prescribed. PID can cause serious health problems if you do not complete your treatment. Follow-up care is a key part of your treatment and safety. Be sure to make and go to all appointments, and call your doctor if you are having problems. It's also a good idea to know your test results and keep a list of the medicines you take. How can you care for yourself at home? · Take your antibiotics as directed. Do not stop taking them just because you feel better. You need to take the full course of antibiotics. · Rest until your fever and pain have improved. · Take pain medicines exactly as directed. ¨ If the doctor gave you a prescription medicine for pain, take it as prescribed. ¨ If you are not taking a prescription pain medicine, ask your doctor if you can take an over-the-counter medicine. · Use a hot water bottle or a heating pad (set on low) on your belly for pain. · Do not douche. · Do not have sex or use tampons (you can use pads instead) until you have taken all the medicine, your pain is gone, and you feel completely well. · Talk to any sex partners you have had in the past 2 months. They need to be tested and may need to be treated for STIs. To prevent STIs  · Use latex condoms every time you have sex. Use them from the beginning to the end of sexual contact. · Talk to your partner before you have sex.  Find out if he or she has or is at risk for any sexually transmitted infection (STI). Keep in mind that a person may be able to spread an STI even if he or she does not have symptoms. · Do not have sex with anyone who has symptoms of an STI, such as sores on the genitals or mouth. · Having one sex partner (who does not have STIs and does not have sex with anyone else) is a good way to avoid STIs. When should you call for help? Call your doctor now or seek immediate medical care if:  ? · You have a new or higher fever. ? · You have unusual vaginal bleeding. ? · You have new or worse belly or pelvic pain. ? · You have vaginal discharge that has increased in amount or smells bad. ? Watch closely for changes in your health, and be sure to contact your doctor if:  ? · You do not get better as expected. Where can you learn more? Go to http://abiFlinjaclifford.info/. Enter N294 in the search box to learn more about \"Pelvic Inflammatory Disease: Care Instructions. \"  Current as of: October 13, 2016  Content Version: 11.4  © 3692-3270 [employer]. Care instructions adapted under license by [employer] (which disclaims liability or warranty for this information). If you have questions about a medical condition or this instruction, always ask your healthcare professional. [address] any warranty or liability for your use of this information. Trichomoniasis: Care Instructions  Your Care Instructions  Trichomoniasis is a sexually transmitted infection (STI) that is spread by having sex with an infected partner. Trichomoniasis is commonly called trich (say \"trick\"). In women, trich may cause vaginal itching and a smelly discharge. But in many cases, especially in men, there are no symptoms. Tessa Chadwick is treated so that you do not spread it to others. Both you and your sex partner or partners should be treated at the same time so you do not infect each other again. Trich may cause problems with pregnancy.  Your doctor will talk with you about treatment for Trich if you are pregnant. Follow-up care is a key part of your treatment and safety. Be sure to make and go to all appointments, and call your doctor if you are having problems. It's also a good idea to know your test results and keep a list of the medicines you take. How can you care for yourself at home? · Take your antibiotics as directed. Do not stop taking them just because you feel better. You need to take the full course of antibiotics. · Do not have sex while you are being treated. If your doctor gave you a single dose of antibiotics, do not have sex for one week after being treated and until your partner also has been treated. · Tell your sex partner (or partners) that he or she will also need to be tested and treated. · Use a cold water compress or cool baths to relieve itching. To prevent trichomoniasis in the future  · Use latex condoms every time you have sex. Use them from the beginning to the end of sexual contact. · Talk to your partner before having sex. Find out if he or she has or is at risk for trich or any other STI. Keep in mind that a person may be able to spread an STI even if he or she does not have symptoms. · Do not have sex if you are being treated for trich or any other STI. · Do not have sex with anyone who has symptoms of an STI, such as sores on the genitals or mouth. · Having one sex partner (who does not have STIs and does not have sex with anyone else) is a good way to avoid STIs. When should you call for help? Call your doctor now or seek immediate medical care if:  ? · You have unusual vaginal bleeding. ? · You have a fever. ? · You have new discharge from the vagina or penis. ? · You have pelvic pain. ? Watch closely for changes in your health, and be sure to contact your doctor if:  ? · You do not get better as expected. ? · You have any new symptoms or your symptoms get worse. Where can you learn more?   Go to http://abi-clifford.info/. Enter Z743 in the search box to learn more about \"Trichomoniasis: Care Instructions. \"  Current as of: March 20, 2017  Content Version: 11.4  © 8446-4837 [employer], [employer]. Care instructions adapted under license by [employer] (which disclaims liability or warranty for this information). If you have questions about a medical condition or this instruction, always ask your healthcare professional. [address] any warranty or liability for your use of this information.

## 2018-03-07 LAB
C TRACH DNA SPEC QL NAA+PROBE: NEGATIVE
N GONORRHOEA DNA SPEC QL NAA+PROBE: NEGATIVE
SAMPLE TYPE: NORMAL
SERVICE CMNT-IMP: NORMAL
SPECIMEN SOURCE: NORMAL

## 2018-03-08 LAB
BACTERIA SPEC CULT: NORMAL
CC UR VC: NORMAL
SERVICE CMNT-IMP: NORMAL

## 2018-03-20 ENCOUNTER — OFFICE VISIT (OUTPATIENT)
Dept: INTERNAL MEDICINE CLINIC | Age: 43
End: 2018-03-20

## 2018-03-20 VITALS
HEIGHT: 64 IN | BODY MASS INDEX: 47 KG/M2 | SYSTOLIC BLOOD PRESSURE: 127 MMHG | DIASTOLIC BLOOD PRESSURE: 76 MMHG | OXYGEN SATURATION: 96 % | RESPIRATION RATE: 18 BRPM | WEIGHT: 275.3 LBS | TEMPERATURE: 98.6 F | HEART RATE: 85 BPM

## 2018-03-20 DIAGNOSIS — G47.33 OSA (OBSTRUCTIVE SLEEP APNEA): ICD-10-CM

## 2018-03-20 DIAGNOSIS — M54.9 CHRONIC RIGHT-SIDED BACK PAIN, UNSPECIFIED BACK LOCATION: Primary | ICD-10-CM

## 2018-03-20 DIAGNOSIS — K42.9 UMBILICAL HERNIA WITHOUT OBSTRUCTION AND WITHOUT GANGRENE: ICD-10-CM

## 2018-03-20 DIAGNOSIS — G89.29 CHRONIC RIGHT-SIDED BACK PAIN, UNSPECIFIED BACK LOCATION: Primary | ICD-10-CM

## 2018-03-20 DIAGNOSIS — E66.01 MORBID OBESITY (HCC): ICD-10-CM

## 2018-03-20 RX ORDER — IBUPROFEN 800 MG/1
800 TABLET ORAL
Qty: 30 TAB | Refills: 0 | Status: SHIPPED | OUTPATIENT
Start: 2018-03-20 | End: 2018-09-10 | Stop reason: SDUPTHER

## 2018-03-20 NOTE — MR AVS SNAPSHOT
[address] [facility] [address] [facility] [address] 
[phone] Patient: Mariela Quinn MRN: [id number] NJR:[id number] Visit Information Date & Time Provider Department Dept. Phone Encounter #  
 3/20/2018  1:30 PM Horace Vasquez MD [address] [facility] [id number] Follow-up Instructions Return in about 4 weeks (around 4/17/2018) for for PE. Upcoming Health Maintenance Date Due Pneumococcal 19-64 Highest Risk (1 of 3 - PCV13) 3/13/1993 DTaP/Tdap/Td series (1 - Tdap) 3/13/1995 PAP AKA CERVICAL CYTOLOGY 3/13/1995 Influenza Age 5 to Adult 8/1/2017 BREAST CANCER SCRN MAMMOGRAM 10/18/2017 MEDICARE YEARLY EXAM 4/28/2018 Allergies as of 3/20/2018  Review Complete On: 3/20/2018 By: Mimi Washington LPN Severity Noted Reaction Type Reactions Norvasc [Amlodipine]  04/27/2017    Other (comments) Ankle swelling Current Immunizations  Never Reviewed No immunizations on file. Not reviewed this visit You Were Diagnosed With   
  
 Codes Comments Chronic right-sided back pain, unspecified back location    -  Primary ICD-10-CM: M54.9, G89.29 ICD-9-CM: 724.5, 338.29 Umbilical hernia without obstruction and without gangrene     ICD-10-CM: K42.9 ICD-9-CM: 553.1 TRACI (obstructive sleep apnea)     ICD-10-CM: G47.33 
ICD-9-CM: 327.23 Vitals BP Pulse Temp Resp Height(growth percentile) Weight(growth percentile) 127/76 (BP 1 Location: Left arm, BP Patient Position: Sitting) 85 98.6 °F (37 °C) (Oral) 18 5' 4\" (1.626 m) 275 lb 4.8 oz (124.9 kg) LMP SpO2 BMI OB Status Smoking Status 03/03/2018 96% 47.26 kg/m2 Having regular periods Current Every Day Smoker Vitals History BMI and BSA Data Body Mass Index Body Surface Area  
 47.26 kg/m 2 2.37 m 2 Preferred Pharmacy Pharmacy Name Phone [facility] [address] [phone] Your Updated Medication List  
  
   
This list is accurate as of 3/20/18  1:49 PM.  Always use your most recent med list.  
  
  
  
  
 ALPRAZolam 1 mg tablet Commonly known as:  XANAX  
take 1 tablet by mouth twice a day  
  
 doxycycline 100 mg capsule Commonly known as:  Lanelle Baton Take 1 Cap by mouth two (2) times a day for 14 days. ibuprofen 800 mg tablet Commonly known as:  MOTRIN Take 1 Tab by mouth every eight (8) hours as needed for Pain. lisinopril-hydroCHLOROthiazide 20-25 mg per tablet Commonly known as:  PRINZIDE, ZESTORETIC  
take 1 tablet by mouth once daily  
  
 venlafaxine-SR 75 mg capsule Commonly known as:  EFFEXOR-XR Take 1 Cap by mouth daily. zolpidem 5 mg tablet Commonly known as:  AMBIEN  
take 1 tablet by mouth at bedtime Prescriptions Sent to Pharmacy Refills  
 ibuprofen (MOTRIN) 800 mg tablet 0 Sig: Take 1 Tab by mouth every eight (8) hours as needed for Pain. Class: Normal  
 Pharmacy: [address] [place] [address] #: [phone] Route: Oral  
  
We Performed the Following REFERRAL TO GENERAL SURGERY [REF27 Custom] REFERRAL TO SLEEP STUDIES [REF99 Custom] Follow-up Instructions Return in about 4 weeks (around 4/17/2018) for for PE. Referral Information Referral ID Referred By Referred To  
  
 [id number] Evelena Essex, MD   
   [address] [place] [address], [address] [place] [address] Phone: [phone] Fax: [phone] Visits Status Start Date End Date 1 New Request 3/20/18 3/20/19 If your referral has a status of pending review or denied, additional information will be sent to support the outcome of this decision. Referral ID Referred By Referred To [id number] Cassie Cintron Not Available Visits Status Start Date End Date 1 New Request 3/20/18 3/20/19 If your referral has a status of pending review or denied, additional information will be sent to support the outcome of this decision. Patient Instructions Starting a Weight Loss Plan: Care Instructions Your Care Instructions If you are thinking about losing weight, it can be hard to know where to start. Your doctor can help you set up a weight loss plan that best meets your needs. You may want to take a class on nutrition or exercise, or join a weight loss support group. If you have questions about how to make changes to your eating or exercise habits, ask your doctor about seeing a registered dietitian or an exercise specialist. 
It can be a big challenge to lose weight. But you do not have to make huge changes at once. Make small changes, and stick with them. When those changes become habit, add a few more changes. If you do not think you are ready to make changes right now, try to pick a date in the future. Make an appointment to see your doctor to discuss whether the time is right for you to start a plan. Follow-up care is a key part of your treatment and safety. Be sure to make and go to all appointments, and call your doctor if you are having problems. It's also a good idea to know your test results and keep a list of the medicines you take. How can you care for yourself at home? · Set realistic goals. Many people expect to lose much more weight than is likely. A weight loss of 5% to 10% of your body weight may be enough to improve your health. · Get family and friends involved to provide support. Talk to them about why you are trying to lose weight, and ask them to help. They can help by participating in exercise and having meals with you, even if they may be eating something different. · Find what works best for you.  If you do not have time or do not like to cook, a program that offers meal replacement bars or shakes may be better for you. Or if you like to prepare meals, finding a plan that includes daily menus and recipes may be best. 
· Ask your doctor about other health professionals who can help you achieve your weight loss goals. ¨ A dietitian can help you make healthy changes in your diet. ¨ An exercise specialist or  can help you develop a safe and effective exercise program. 
¨ A counselor or psychiatrist can help you cope with issues such as depression, anxiety, or family problems that can make it hard to focus on weight loss. · Consider joining a support group for people who are trying to lose weight. Your doctor can suggest groups in your area. Where can you learn more? Go to http://abi-clifford.info/. Enter A362 in the search box to learn more about \"Starting a Weight Loss Plan: Care Instructions. \" Current as of: October 13, 2016 Content Version: 11.4 © 6026-8231 [employer]. Care instructions adapted under license by [employer] (which disclaims liability or warranty for this information). If you have questions about a medical condition or this instruction, always ask your healthcare professional. [address] any warranty or liability for your use of this information. Introducing [facility]! [employer] introduces Exterity patient portal. Now you can access parts of your medical record, email your doctor's office, and request medication refills online. 1. In your internet browser, go to [email or web address] 2. Click on the First Time User? Click Here link in the Sign In box. You will see the New Member Sign Up page. 3. Enter your Exterity Access Code exactly as it appears below. You will not need to use this code after youve completed the sign-up process.  If you do not sign up before the expiration date, you must request a new code. 
 
· ISC8 Access Code: [email or web address] Expires: 3/27/2018  1:56 PM 
 
4. Enter the last four digits of your Social Security Number (xxxx) and Date of Birth (mm/dd/yyyy) as indicated and click Submit. You will be taken to the next sign-up page. 5. Create a ISC8 ID. This will be your ISC8 login ID and cannot be changed, so think of one that is secure and easy to remember. 6. Create a ISC8 password. You can change your password at any time. 7. Enter your Password Reset Question and Answer. This can be used at a later time if you forget your password. 8. Enter your e-mail address. You will receive e-mail notification when new information is available in [address] 9. Click Sign Up. You can now view and download portions of your medical record. 10. Click the Download Summary menu link to download a portable copy of your medical information. If you have questions, please visit the Frequently Asked Questions section of the ISC8 website. Remember, ISC8 is NOT to be used for urgent needs. For medical emergencies, dial 911. Now available from your iPhone and Android! Please provide this summary of care documentation to your next provider. Your primary care clinician is listed as NONE. If you have any questions after today's visit, please call [phone].

## 2018-03-20 NOTE — PROGRESS NOTES
1. Have you been to the ER, urgent care clinic since your last visit? Hospitalized since your last visit? Yes When: 3/6/2018 [facility] PID UTI and Trichomonis    2. Have you seen or consulted any other health care providers outside of the [address] [place] [address] since your last visit? Include any pap smears or colon screening.  No      Pt states her last dose of abt was on Sunday and the pain since then gotten worse to right flank area, describe as \"sharp pain\"

## 2018-03-20 NOTE — PATIENT INSTRUCTIONS

## 2018-03-20 NOTE — PROGRESS NOTES
Didier García is a 39 y.o. female and presents with ED Follow-up  . Subjective:  Pt presented to ED on 3/4/18 and 3/6/18 for rt flank pain. Pt was tx for UTI initially, then empirically for PID the second presentation. All test results negative (urine cx/ct abd and gc/chlamydia). Pt w persist rt sided mid/lower bacjk pain w radiation around side to anterior abdomen area. Worse w mvmnt, better w remaining still. She denies any heavy lifting or ppt factors  Pt has known sciatica, but this \"feels different\" to her. Pt is also concerned bc she s/p    Review of Systems  Constitutional: negative for fevers, chills, anorexia and weight loss  Eyes:   negative for visual disturbance and irritation  ENT:   negative for tinnitus,sore throat,nasal congestion,ear pains. hoarseness  Respiratory:  negative for cough, hemoptysis, dyspnea,wheezing  CV:   negative for chest pain, palpitations, lower extremity edema  Musculoskel: negative for myalgias, arthralgias, back pain, muscle weakness, joint pain  Neurological:  negative for headaches, dizziness, vertigo, memory problems and gait   Behavl/Psych: negative for feelings of anxiety, depression, mood changes    Past Medical History:   Diagnosis Date    Hx MRSA infection     Hypertension     Infectious disease     MRSA    Neurological disorder     right wrist carpal tunnel    Other ill-defined conditions(799.89)     Chronic Back pain     Psychiatric disorder     depression     Past Surgical History:   Procedure Laterality Date    HX CARPAL TUNNEL RELEASE      right wrist    HX  SECTION      x3    HX CHOLECYSTECTOMY      HX HERNIA REPAIR      3/2011     Social History     Social History    Marital status: LEGALLY      Spouse name: N/A    Number of children: N/A    Years of education: N/A     Social History Main Topics    Smoking status: Current Every Day Smoker     Packs/day: 1.50    Smokeless tobacco: Never Used    Alcohol use Yes Comment: Occasionlly - holidays    Drug use: No    Sexual activity: Yes     Partners: Male     Birth control/ protection: Condom     Other Topics Concern    None     Social History Narrative    4/27/17: on disability for depression, spends most days at home. Family History   Problem Relation Age of Onset    Lung Disease Mother      sarcoidosis    Diabetes Father     Kidney Disease Father     Pacemaker Father      Current Outpatient Prescriptions   Medication Sig Dispense Refill    ibuprofen (MOTRIN) 800 mg tablet Take 1 Tab by mouth every eight (8) hours as needed for Pain. 30 Tab 0    venlafaxine-SR (EFFEXOR-XR) 75 mg capsule Take 1 Cap by mouth daily.  30 Cap 2    ALPRAZolam (XANAX) 1 mg tablet take 1 tablet by mouth twice a day 60 Tab 2    zolpidem (AMBIEN) 5 mg tablet take 1 tablet by mouth at bedtime 30 Tab 1    lisinopril-hydroCHLOROthiazide (PRINZIDE, ZESTORETIC) 20-25 mg per tablet take 1 tablet by mouth once daily  1     Allergies   Allergen Reactions    Norvasc [Amlodipine] Other (comments)     Ankle swelling       Objective:  Visit Vitals    /76 (BP 1 Location: Left arm, BP Patient Position: Sitting)    Pulse 85    Temp 98.6 °F (37 °C) (Oral)    Resp 18    Ht 5' 4\" (1.626 m)    Wt 275 lb 4.8 oz (124.9 kg)    LMP 03/03/2018    SpO2 96%    BMI 47.26 kg/m2     Physical Exam:   General appearance - alert, pleasant, morbidly obese lady in NAD  Mental status - alert, oriented to person, place, and time  EYE-MOHIT, EOMI, corneas normal, no foreign bodies  ENT-ENT exam normal, no neck nodes or sinus tenderness  Neck - supple, no significant adenopathy   Chest - clear to auscultation, no wheezes, rales or rhonchi, symmetric air entry   Heart - normal rate, regular rhythm, normal S1, S2, no murmurs, rubs, clicks or gallops   Abdomen - soft, nontender, nondistended, no masses or organomegaly  Back + tenderness rt midback/side  Ext-peripheral pulses normal, no pedal edema, no clubbing or cyanosis  Skin-Warm and dry. no hyperpigmentation, vitiligo, or suspicious lesions  Neuro -alert, oriented, normal speech, no focal findings or movement disorder noted    Results for orders placed or performed during the hospital encounter of 03/06/18   CULTURE, URINE   Result Value Ref Range    Special Requests: NO SPECIAL REQUESTS  Reflexed from [id number]        [place] Count <1,000 CFU/ML      Culture result: NO GROWTH 2 DAYS     URINALYSIS W/ REFLEX CULTURE   Result Value Ref Range    Color YELLOW/STRAW      Appearance CLOUDY (A) CLEAR      Specific gravity 1.020 1.003 - 1.030      pH (UA) 6.0 5.0 - 8.0      Protein NEGATIVE  NEG mg/dL    Glucose NEGATIVE  NEG mg/dL    Ketone NEGATIVE  NEG mg/dL    Bilirubin NEGATIVE  NEG      Blood SMALL (A) NEG      Urobilinogen 0.2 0.2 - 1.0 EU/dL    Nitrites NEGATIVE  NEG      Leukocyte Esterase SMALL (A) NEG      WBC 5-10 0 - 4 /hpf    RBC 0-5 0 - 5 /hpf    Epithelial cells FEW FEW /lpf    Bacteria NEGATIVE  NEG /hpf    UA:UC IF INDICATED URINE CULTURE ORDERED (A) CNI      Trichomonas PRESENT (A) NEG     CBC WITH AUTOMATED DIFF   Result Value Ref Range    WBC 8.3 3.6 - 11.0 K/uL    RBC 4.69 3.80 - 5.20 M/uL    HGB 14.0 11.5 - 16.0 g/dL    HCT 41.5 35.0 - 47.0 %    MCV 88.5 80.0 - 99.0 FL    MCH 29.9 26.0 - 34.0 PG    MCHC 33.7 30.0 - 36.5 g/dL    RDW 13.2 11.5 - 14.5 %    PLATELET 287 831 - 550 K/uL    MPV 10.2 8.9 - 12.9 FL    NRBC 0.0 0  WBC    ABSOLUTE NRBC 0.00 0.00 - 0.01 K/uL    NEUTROPHILS 55 32 - 75 %    LYMPHOCYTES 33 12 - 49 %    MONOCYTES 7 5 - 13 %    EOSINOPHILS 4 0 - 7 %    BASOPHILS 1 0 - 1 %    IMMATURE GRANULOCYTES 1 (H) 0.0 - 0.5 %    ABS. NEUTROPHILS 4.5 1.8 - 8.0 K/UL    ABS. LYMPHOCYTES 2.7 0.8 - 3.5 K/UL    ABS. MONOCYTES 0.6 0.0 - 1.0 K/UL    ABS. EOSINOPHILS 0.3 0.0 - 0.4 K/UL    ABS. BASOPHILS 0.0 0.0 - 0.1 K/UL    ABS. IMM.  GRANS. 0.1 (H) 0.00 - 0.04 K/UL    DF AUTOMATED     METABOLIC PANEL, COMPREHENSIVE   Result Value Ref Range    Sodium 142 136 - 145 mmol/L    Potassium 3.6 3.5 - 5.1 mmol/L    Chloride 105 97 - 108 mmol/L    CO2 28 21 - 32 mmol/L    Anion gap 9 5 - 15 mmol/L    Glucose 108 (H) 65 - 100 mg/dL    BUN 13 6 - 20 MG/DL    Creatinine 0.76 0.55 - 1.02 MG/DL    BUN/Creatinine ratio 17 12 - 20      GFR est AA >60 >60 ml/min/1.73m2    GFR est non-AA >60 >60 ml/min/1.73m2    Calcium 9.4 8.5 - 10.1 MG/DL    Bilirubin, total 0.2 0.2 - 1.0 MG/DL    ALT (SGPT) 25 12 - 78 U/L    AST (SGOT) 13 (L) 15 - 37 U/L    Alk. phosphatase 96 45 - 117 U/L    Protein, total 7.6 6.4 - 8.2 g/dL    Albumin 3.7 3.5 - 5.0 g/dL    Globulin 3.9 2.0 - 4.0 g/dL    A-G Ratio 0.9 (L) 1.1 - 2.2     CHLAMYDIA/GC PCR   Result Value Ref Range    Sample type URINE      Source URINE      Chlamydia amplified NEGATIVE  NEG      N. gonorrhea, amplified NEGATIVE  NEG      Comment        Testing performed by the Roche Hernandez CT/NG method, utilizing PCR amplification to identify DNA of the pathogens. This method is not recommended as the sole method of evaluation of cases of sexual abuse nor for other medico-legal indications. HCG URINE, QL. - POC   Result Value Ref Range    Pregnancy test,urine (POC) NEGATIVE  NEG         Assessment/Plan:    ICD-10-CM ICD-9-CM    1. Chronic right-sided back pain, unspecified back location M54.9 724.5     G89.29 338.29    2. Umbilical hernia without obstruction and without gangrene K42.9 553.1 REFERRAL TO GENERAL SURGERY   3. TRACI (obstructive sleep apnea) G47.33 327.23 REFERRAL TO SLEEP STUDIES      CANCELED: REFERRAL TO SLEEP STUDIES   4.  Morbid obesity ([address] E66.01 278.01 REFERRAL TO SLEEP STUDIES     Orders Placed This Encounter   [facility] General Surgery ref [facility]     Referral Priority:   Routine     Referral Type:   Consultation     Referral Reason:   Specialty Services Required     Referred to Provider:   Caleb Henson MD    REFERRAL TO SLEEP STUDIES     Referral Priority:   Routine     Referral Type:   Consultation     Referral Reason: Specialty Services Required     Referral Location:   [facility]     Referred to Provider:   Kelly Oglesby MD     Requested Specialty:   Sleep Medicine    ibuprofen (MOTRIN) 800 mg tablet     Sig: Take 1 Tab by mouth every eight (8) hours as needed for Pain. Dispense:  30 Tab     Refill:  0   Will tx as MSK pain w moist heat and NSAIDS  F/u PE/prn  NO s/s infection  Abd ct scan neg  Refer to surgery for eval umbil hernia  Patient Instructions        Starting a Weight Loss Plan: Care Instructions  Your Care Instructions    If you are thinking about losing weight, it can be hard to know where to start. Your doctor can help you set up a weight loss plan that best meets your needs. You may want to take a class on nutrition or exercise, or join a weight loss support group. If you have questions about how to make changes to your eating or exercise habits, ask your doctor about seeing a registered dietitian or an exercise specialist.  It can be a big challenge to lose weight. But you do not have to make huge changes at once. Make small changes, and stick with them. When those changes become habit, add a few more changes. If you do not think you are ready to make changes right now, try to pick a date in the future. Make an appointment to see your doctor to discuss whether the time is right for you to start a plan. Follow-up care is a key part of your treatment and safety. Be sure to make and go to all appointments, and call your doctor if you are having problems. It's also a good idea to know your test results and keep a list of the medicines you take. How can you care for yourself at home? · Set realistic goals. Many people expect to lose much more weight than is likely. A weight loss of 5% to 10% of your body weight may be enough to improve your health. · Get family and friends involved to provide support.  Talk to them about why you are trying to lose weight, and ask them to help. They can help by participating in exercise and having meals with you, even if they may be eating something different. · Find what works best for you. If you do not have time or do not like to cook, a program that offers meal replacement bars or shakes may be better for you. Or if you like to prepare meals, finding a plan that includes daily menus and recipes may be best.  · Ask your doctor about other health professionals who can help you achieve your weight loss goals. ¨ A dietitian can help you make healthy changes in your diet. ¨ An exercise specialist or  can help you develop a safe and effective exercise program.  ¨ A counselor or psychiatrist can help you cope with issues such as depression, anxiety, or family problems that can make it hard to focus on weight loss. · Consider joining a support group for people who are trying to lose weight. Your doctor can suggest groups in your area. Where can you learn more? Go to http://abiTime Bomb Dealsclifford.info/. Enter J391 in the search box to learn more about \"Starting a Weight Loss Plan: Care Instructions. \"  Current as of: October 13, 2016  Content Version: 11.4  © 0709-6761 [employer]. Care instructions adapted under license by [employer] (which disclaims liability or warranty for this information). If you have questions about a medical condition or this instruction, always ask your healthcare professional. [facility] [address] any warranty or liability for your use of this information. Follow-up Disposition:  Return in about 4 weeks (around 4/17/2018) for for PE. I have reviewed with the patient details of the assessment and plan and all questions were answered. Relevent patient education was performed. The most recent lab findings were reviewed with the patient. An After Visit Summary was printed and given to the patient.

## 2018-04-02 RX ORDER — ALPRAZOLAM 1 MG/1
TABLET ORAL
Qty: 60 TAB | Refills: 2 | OUTPATIENT
Start: 2018-04-02

## 2018-04-03 RX ORDER — ZOLPIDEM TARTRATE 5 MG/1
TABLET ORAL
Qty: 30 TAB | Refills: 1 | OUTPATIENT
Start: 2018-04-03

## 2018-04-03 RX ORDER — ALPRAZOLAM 1 MG/1
TABLET ORAL
Qty: 60 TAB | Refills: 2 | OUTPATIENT
Start: 2018-04-03

## 2018-04-04 NOTE — PROGRESS NOTES
Pt here for   Chief Complaint   Patient presents with    Back Pain    Leg Pain     1. Have you been to the ER, urgent care clinic since your last visit? Hospitalized since your last visit? No    2. Have you seen or consulted any other health care providers outside of the [address] [place] [address] since your last visit? Include any pap smears or colon screening.  No     Pt /co pain 8 of 10, pt denies taking nay pain meds today numerical 0-10

## 2018-04-12 ENCOUNTER — OFFICE VISIT (OUTPATIENT)
Dept: SURGERY | Age: 43
End: 2018-04-12

## 2018-04-12 VITALS
TEMPERATURE: 98 F | DIASTOLIC BLOOD PRESSURE: 80 MMHG | HEART RATE: 76 BPM | OXYGEN SATURATION: 98 % | HEIGHT: 64 IN | WEIGHT: 275 LBS | BODY MASS INDEX: 46.95 KG/M2 | RESPIRATION RATE: 16 BRPM | SYSTOLIC BLOOD PRESSURE: 134 MMHG

## 2018-04-12 DIAGNOSIS — K43.9 VENTRAL HERNIA WITHOUT OBSTRUCTION OR GANGRENE: Primary | ICD-10-CM

## 2018-04-12 PROBLEM — E66.01 OBESITY, MORBID (HCC): Status: ACTIVE | Noted: 2018-04-12

## 2018-04-12 NOTE — MR AVS SNAPSHOT
[address] [facility], [place] 
 
 
 [address] [place] [address] [address] [phone] 
[phone] Patient: Brianna Hope MRN: [id number] TLS:1/25/3731 Visit Information Date & Time Provider Department Dept. Phone Encounter #  
 4/12/2018  2:10 PM MD Geoffrey Andersonfranco [id number] [facility] - [facility] [id number] Your Appointments 4/20/2018  8:45 AM  
Complete Physical with Yobany Pelayo MD  
[address] [facility]) Appt Note: Complete Physical  
 [place] [address] [facility] [address]  
[phone]  
  
   
 [address]  
  
    
 7/25/2018  9:00 AM  
New Patient with Paige Castañeda MD  
[address] [place] [address] ([facility]) Appt Note: NP; referred by Dr. Gael Pérez; TRACI  
 [address] [facility], [address] [phone] [address] [place] [address], [address] [phone] Upcoming Health Maintenance Date Due Pneumococcal 19-64 Highest Risk (1 of 3 - PCV13) 3/13/1993 DTaP/Tdap/Td series (1 - Tdap) 3/13/1995 PAP AKA CERVICAL CYTOLOGY 3/13/1995 Influenza Age 5 to Adult 8/1/2017 BREAST CANCER SCRN MAMMOGRAM 10/18/2017 MEDICARE YEARLY EXAM 4/28/2018 Allergies as of 4/12/2018  Review Complete On: 4/12/2018 By: Vic Malave MD  
  
 Severity Noted Reaction Type Reactions Norvasc [Amlodipine]  04/27/2017    Other (comments) Ankle swelling Current Immunizations  Never Reviewed No immunizations on file. Not reviewed this visit You Were Diagnosed With   
  
 Codes Comments Ventral hernia without obstruction or gangrene    -  Primary ICD-10-CM: K43.9 ICD-9-CM: 553.20 Vitals BP Pulse Temp Resp Height(growth percentile) Weight(growth percentile)  134/80 (BP 1 Location: Left arm, BP Patient Position: Sitting) 76 98 °F (36.7 °C) (Oral) 16 5' 4\" (1.626 m) 275 lb (124.7 kg) SpO2 BMI OB Status Smoking Status 98% 47.2 kg/m2 Having regular periods Current Every Day Smoker BMI and BSA Data Body Mass Index Body Surface Area  
 47.2 kg/m 2 2.37 m 2 Preferred Pharmacy Pharmacy Name Phone [id number] [facility], [address] [place], [place] [address] [phone] Your Updated Medication List  
  
   
This list is accurate as of 4/12/18  3:01 PM.  Always use your most recent med list.  
  
  
  
  
 ALPRAZolam 1 mg tablet Commonly known as:  XANAX  
take 1 tablet by mouth twice a day  
  
 ibuprofen 800 mg tablet Commonly known as:  MOTRIN Take 1 Tab by mouth every eight (8) hours as needed for Pain. lisinopril-hydroCHLOROthiazide 20-25 mg per tablet Commonly known as:  PRINZIDE, ZESTORETIC  
take 1 tablet by mouth once daily  
  
 venlafaxine-SR 75 mg capsule Commonly known as:  EFFEXOR-XR Take 1 Cap by mouth daily. zolpidem 5 mg tablet Commonly known as:  AMBIEN  
take 1 tablet by mouth at bedtime Introducing [facility]! [facility] introduces Gone! patient portal. Now you can access parts of your medical record, email your doctor's office, and request medication refills online. 1. In your internet browser, go to [email or web address]://"[employer]. [employer]/"[employer] 2. Click on the First Time User? Click Here link in the Sign In box. You will see the New Member Sign Up page. 3. Enter your Gone! Access Code exactly as it appears below. You will not need to use this code after youve completed the sign-up process. If you do not sign up before the expiration date, you must request a new code. · Gone! Access Code: [email or web address] Expires: 7/2/2018  5:32 AM 
 
4. Enter the last four digits of your Social Security Number (xxxx) and Date of Birth (mm/dd/yyyy) as indicated and click Submit. You will be taken to the next sign-up page. 5. Create a [employer] ID. This will be your [employer] login ID and cannot be changed, so think of one that is secure and easy to remember. 6. Create a [employer] password. You can change your password at any time. 7. Enter your Password Reset Question and Answer. This can be used at a later time if you forget your password. 8. Enter your e-mail address. You will receive e-mail notification when new information is available in [address] 9. Click Sign Up. You can now view and download portions of your medical record. 10. Click the Download Summary menu link to download a portable copy of your medical information. If you have questions, please visit the Frequently Asked Questions section of the [employer] website. Remember, World[employer] is NOT to be used for urgent needs. For medical emergencies, dial 911. Now available from your iPhone and Android! Please provide this summary of care documentation to your next provider. Your primary care clinician is listed as NONE. If you have any questions after today's visit, please call [phone].

## 2018-04-12 NOTE — PROGRESS NOTES
1. Have you been to the ER, urgent care clinic since your last visit? Hospitalized since your last visit? No    2. Have you seen or consulted any other health care providers outside of the [facility] since your last visit? Include any pap smears or colon screening.  No

## 2018-04-12 NOTE — PROGRESS NOTES
HISTORY OF PRESENT ILLNESS  Constance ULRICH Vivienne Ignacio is a 39 y.o. female who is referred by Dr. Luis Armando Oreilly for further evaluation of a ventral hernia. HPI Comments: Ms. Vivienne Ignacio tells me that she noted an abdominal wall bulge approx. 2 weeks ago. The bulge is in the midline below the umbilicus. The bulge is becoming somewhat more larger and more bothersome to her. She has otherwise been in her usual state of health. Of note, Ms. Vivienne Ignacio is s/p ventral hernia repair in  or . According to Ms. Vivienne Ignacio, mesh was not placed. (By report. Records not available.)  Furthermore, she was seen in the ER in early 2018 with flank pain. CT Scan abdomen/pelvis without contrast at that time - No acute abnormality in the abdomen or pelvis. Past Medical History:  No date: Hx MRSA infection  No date: Hypertension  No date: Infectious disease      Comment: MRSA  No date: Neurological disorder      Comment: right wrist carpal tunnel  No date: Other ill-defined conditions(799.89)      Comment: Chronic Back pain   No date: Psychiatric disorder      Comment: depression  2018: Ventral hernia without obstruction or gangrene    Past Surgical History:  No date: HX CARPAL TUNNEL RELEASE      Comment: right wrist  No date: HX  SECTION      Comment: x3  No date: HX CHOLECYSTECTOMY  No date: HX HERNIA REPAIR      Comment: 3/2011    Review of patient's family history indicates:    Lung Disease                   Mother                      Comment: sarcoidosis    Diabetes                       Father                    Kidney Disease                 Father                    Pacemaker                      Father                    Social History: Employment - Disabled. Tobacco - Cigarettes, 1 and 1/2 packs daily. EtOH - Beer, one per week. Review of systems negative except as noted. Review of Systems   Constitutional: Negative for chills and fever. Gastrointestinal: Positive for abdominal pain.  Negative for nausea and vomiting. Musculoskeletal: Positive for back pain and myalgias. Psychiatric/Behavioral: Positive for depression. The patient is nervous/anxious and has insomnia. Physical Exam   Constitutional: No distress. Obese female. HENT:   Head: Normocephalic and atraumatic. Eyes: No scleral icterus. Neck: Neck supple. Cardiovascular: Normal rate and regular rhythm. Pulmonary/Chest: Effort normal and breath sounds normal.   Abdominal: Soft. She exhibits mass (In the midline, below the umbilicus, there is a palpable fullness. A discrete hernia defect is not apprecicated. (Exam limited)). She exhibits no distension. There is tenderness (Below umbilicus in midline. ). There is no rebound and no guarding. Musculoskeletal: Normal range of motion. Lymphadenopathy:     She has no cervical adenopathy. Neurological: She is alert. Skin:   Well healed abdominal wall scars. Vitals reviewed. ASSESSMENT and PLAN  Reviewed CT Scan from 3/4/2018. Explained to Ms. Joselo Gray that she does not appear to have a ventral hernia on exam or on the recent CT Scan. Will check an abdominal wall ultrasound to better evaluate the abdominal wall \"fullness\" and will see her following that to review findings with her. Will also try to obtain records from her hernia repair. Follow up with Dr. Chiquita Al as scheduled. She is agreeable to this plan and is most certainly free to contact the office should any questions or concerns arise.      CC: Tracey Kate MD

## 2018-04-20 ENCOUNTER — OFFICE VISIT (OUTPATIENT)
Dept: INTERNAL MEDICINE CLINIC | Age: 43
End: 2018-04-20

## 2018-04-20 ENCOUNTER — HOSPITAL ENCOUNTER (OUTPATIENT)
Dept: ULTRASOUND IMAGING | Age: 43
Discharge: HOME | End: 2018-04-20
Attending: SURGERY

## 2018-04-20 VITALS
HEIGHT: 64 IN | DIASTOLIC BLOOD PRESSURE: 99 MMHG | TEMPERATURE: 98.3 F | RESPIRATION RATE: 18 BRPM | SYSTOLIC BLOOD PRESSURE: 140 MMHG | BODY MASS INDEX: 47.8 KG/M2 | WEIGHT: 280 LBS | HEART RATE: 106 BPM | OXYGEN SATURATION: 100 %

## 2018-04-20 DIAGNOSIS — B37.31 VAGINAL CANDIDIASIS: ICD-10-CM

## 2018-04-20 DIAGNOSIS — Z23 ENCOUNTER FOR IMMUNIZATION: ICD-10-CM

## 2018-04-20 DIAGNOSIS — G47.33 OBSTRUCTIVE SLEEP APNEA SYNDROME: ICD-10-CM

## 2018-04-20 DIAGNOSIS — Z12.39 BREAST CANCER SCREENING: ICD-10-CM

## 2018-04-20 DIAGNOSIS — Z11.3 SCREEN FOR STD (SEXUALLY TRANSMITTED DISEASE): ICD-10-CM

## 2018-04-20 DIAGNOSIS — L83 ACANTHOSIS NIGRICANS: ICD-10-CM

## 2018-04-20 DIAGNOSIS — Z12.4 CERVICAL CANCER SCREENING: ICD-10-CM

## 2018-04-20 DIAGNOSIS — E66.01 OBESITY, MORBID (HCC): ICD-10-CM

## 2018-04-20 DIAGNOSIS — K43.9 VENTRAL HERNIA WITHOUT OBSTRUCTION OR GANGRENE: ICD-10-CM

## 2018-04-20 DIAGNOSIS — Z00.00 WELL ADULT EXAM: Primary | ICD-10-CM

## 2018-04-20 DIAGNOSIS — G47.00 INSOMNIA, UNSPECIFIED TYPE: ICD-10-CM

## 2018-04-20 DIAGNOSIS — R73.9 ELEVATED BLOOD SUGAR: ICD-10-CM

## 2018-04-20 DIAGNOSIS — I10 ESSENTIAL HYPERTENSION: ICD-10-CM

## 2018-04-20 RX ORDER — ZOLPIDEM TARTRATE 5 MG/1
TABLET ORAL
Qty: 30 TAB | Refills: 1 | Status: SHIPPED | OUTPATIENT
Start: 2018-04-20 | End: 2022-06-01

## 2018-04-20 RX ORDER — LISINOPRIL AND HYDROCHLOROTHIAZIDE 20; 25 MG/1; MG/1
TABLET ORAL
Qty: 60 TAB | Refills: 1 | Status: SHIPPED | OUTPATIENT
Start: 2018-04-20 | End: 2018-09-10 | Stop reason: SINTOL

## 2018-04-20 RX ORDER — FLUCONAZOLE 150 MG/1
150 TABLET ORAL ONCE
Qty: 1 TAB | Refills: 0 | Status: SHIPPED | OUTPATIENT
Start: 2018-04-20 | End: 2018-04-20

## 2018-04-20 RX ORDER — VENLAFAXINE HYDROCHLORIDE 75 MG/1
75 CAPSULE, EXTENDED RELEASE ORAL DAILY
Qty: 30 CAP | Refills: 2 | Status: SHIPPED | OUTPATIENT
Start: 2018-04-20 | End: 2018-06-05 | Stop reason: SDUPTHER

## 2018-04-20 RX ORDER — FLUCONAZOLE 150 MG/1
150 TABLET ORAL DAILY
Qty: 1 TAB | Refills: 0 | Status: SHIPPED | OUTPATIENT
Start: 2018-04-20 | End: 2018-04-20 | Stop reason: SDUPTHER

## 2018-04-20 NOTE — PROGRESS NOTES
After obtaining consent, and per orders of Dr. Marcia Ceja, injection of TDAP given by Homer Land LPN. Patient instructed to remain in clinic for 15 minutes afterwards, and to report any adverse reaction to me immediately.

## 2018-04-20 NOTE — PROGRESS NOTES
Paz Cheng is a 39 y.o. female and presents with Complete Physical and Medication Refill  . Subjective: For PE  PMH:  Depression and anxiety- pt also requests a refill on her effexor/ambien and alprazolam. She was seeing NP Tree Coyne in our psychiatry dept. Pts last appt . - checked last rx       Morbid obesity-  Wt Readings from Last 3 Encounters:   18 280 lb (127 kg)   18 275 lb (124.7 kg)   18 275 lb 4.8 oz (124.9 kg)         BP Readings from Last 3 Encounters:   18 (!) 140/99   18 134/80   18 127/76           HM:  mammo ? ? Colonoscopy  Immunizations ? ? Eye care ~ 3 yrs   Dental care 3 yrs ago  Pap ?? SH  seperated  + sex active condoms sometime  HIV tested ? ??    Review of Systems  Review of systems (12) negative, except noted above. Past Medical History:   Diagnosis Date    Hx MRSA infection     Hypertension     Infectious disease     MRSA    Neurological disorder     right wrist carpal tunnel    Other ill-defined conditions(799.89)     Chronic Back pain     Psychiatric disorder     depression    Ventral hernia without obstruction or gangrene 2018     Past Surgical History:   Procedure Laterality Date    HX CARPAL TUNNEL RELEASE      right wrist    HX  SECTION      x3    HX CHOLECYSTECTOMY      HX HERNIA REPAIR      3/2011     Social History     Social History    Marital status: LEGALLY      Spouse name: N/A    Number of children: N/A    Years of education: N/A     Social History Main Topics    Smoking status: Current Every Day Smoker     Packs/day: 1.50    Smokeless tobacco: Never Used    Alcohol use Yes      Comment: Occasionlly - holidays    Drug use: No    Sexual activity: Yes     Partners: Male     Birth control/ protection: Condom     Other Topics Concern    None     Social History Narrative    16: on disability for depression, spends most days at home.       Family History   Problem Relation Age of Onset    Lung Disease Mother      sarcoidosis    Diabetes Father     Kidney Disease Father     Pacemaker Father      Current Outpatient Prescriptions   Medication Sig Dispense Refill    fluconazole (DIFLUCAN) 150 mg tablet Take 1 Tab by mouth once for 1 dose. Indications: Vulvovaginal Candidiasis 1 Tab 0    venlafaxine-SR (EFFEXOR-XR) 75 mg capsule Take 1 Cap by mouth daily. 30 Cap 2    zolpidem (AMBIEN) 5 mg tablet take 1 tablet by mouth at bedtime prn NOT TO BE USED EVERY NIGHT. IT IS A PRN medication 30 Tab 1    ibuprofen (MOTRIN) 800 mg tablet Take 1 Tab by mouth every eight (8) hours as needed for Pain. 30 Tab 0    ALPRAZolam (XANAX) 1 mg tablet take 1 tablet by mouth twice a day 60 Tab 2    lisinopril-hydroCHLOROthiazide (PRINZIDE, ZESTORETIC) 20-25 mg per tablet take 1 tablet by mouth once daily  1     Allergies   Allergen Reactions    Norvasc [Amlodipine] Other (comments)     Ankle swelling       Objective:  Visit Vitals    BP (!) 140/99    Pulse (!) 106    Temp 98.3 °F (36.8 °C) (Oral)    Resp 18    Ht 5' 4\" (1.626 m)    Wt 280 lb (127 kg)    SpO2 100%    BMI 48.06 kg/m2     Physical Exam:   General appearance - alert, morbidly obese lady in NAD  Mental status - alert, oriented to person, place, and time  EYE-MOHIT, EOMI, corneas normal, no foreign bodies  ENT-ENT exam normal, no neck nodes or sinus tenderness  Mouth - mucous membranes moist, pharynx normal without lesions  Neck - supple, no significant adenopathy + hyperpig posterior neck  Chest - clear to auscultation, no wheezes, rales or rhonchi, symmetric air entry   Heart - normal rate, regular rhythm, normal S1, S2  Abdomen - soft, obese  Genital exam-nml female external genitalia, copius amts of thisk, whitish d/c in vag vault and on vaginal walls  Ext-peripheral pulses normal, no pedal edema, no clubbing or cyanosis  Skin-Warm and dry.  no hyperpigmentation, vitiligo, or suspicious lesions  Neuro -alert, oriented, normal speech, no focal findings or movement disorder noted        Results for orders placed or performed during the hospital encounter of 03/06/18   CULTURE, URINE   Result Value Ref Range    Special Requests: NO SPECIAL REQUESTS  Reflexed from [id number]        [place] Count <1,000 CFU/ML      Culture result: NO GROWTH 2 DAYS     URINALYSIS W/ REFLEX CULTURE   Result Value Ref Range    Color YELLOW/STRAW      Appearance CLOUDY (A) CLEAR      Specific gravity 1.020 1.003 - 1.030      pH (UA) 6.0 5.0 - 8.0      Protein NEGATIVE  NEG mg/dL    Glucose NEGATIVE  NEG mg/dL    Ketone NEGATIVE  NEG mg/dL    Bilirubin NEGATIVE  NEG      Blood SMALL (A) NEG      Urobilinogen 0.2 0.2 - 1.0 EU/dL    Nitrites NEGATIVE  NEG      Leukocyte Esterase SMALL (A) NEG      WBC 5-10 0 - 4 /hpf    RBC 0-5 0 - 5 /hpf    Epithelial cells FEW FEW /lpf    Bacteria NEGATIVE  NEG /hpf    UA:UC IF INDICATED URINE CULTURE ORDERED (A) CNI      Trichomonas PRESENT (A) NEG     CBC WITH AUTOMATED DIFF   Result Value Ref Range    WBC 8.3 3.6 - 11.0 K/uL    RBC 4.69 3.80 - 5.20 M/uL    HGB 14.0 11.5 - 16.0 g/dL    HCT 41.5 35.0 - 47.0 %    MCV 88.5 80.0 - 99.0 FL    MCH 29.9 26.0 - 34.0 PG    MCHC 33.7 30.0 - 36.5 g/dL    RDW 13.2 11.5 - 14.5 %    PLATELET 694 415 - 992 K/uL    MPV 10.2 8.9 - 12.9 FL    NRBC 0.0 0  WBC    ABSOLUTE NRBC 0.00 0.00 - 0.01 K/uL    NEUTROPHILS 55 32 - 75 %    LYMPHOCYTES 33 12 - 49 %    MONOCYTES 7 5 - 13 %    EOSINOPHILS 4 0 - 7 %    BASOPHILS 1 0 - 1 %    IMMATURE GRANULOCYTES 1 (H) 0.0 - 0.5 %    ABS. NEUTROPHILS 4.5 1.8 - 8.0 K/UL    ABS. LYMPHOCYTES 2.7 0.8 - 3.5 K/UL    ABS. MONOCYTES 0.6 0.0 - 1.0 K/UL    ABS. EOSINOPHILS 0.3 0.0 - 0.4 K/UL    ABS. BASOPHILS 0.0 0.0 - 0.1 K/UL    ABS. IMM.  GRANS. 0.1 (H) 0.00 - 0.04 K/UL    DF AUTOMATED     METABOLIC PANEL, COMPREHENSIVE   Result Value Ref Range    Sodium 142 136 - 145 mmol/L    Potassium 3.6 3.5 - 5.1 mmol/L    Chloride 105 97 - 108 mmol/L    CO2 28 21 - 32 mmol/L    Anion gap 9 5 - 15 mmol/L    Glucose 108 (H) 65 - 100 mg/dL    BUN 13 6 - 20 MG/DL    Creatinine 0.76 0.55 - 1.02 MG/DL    BUN/Creatinine ratio 17 12 - 20      GFR est AA >60 >60 ml/min/1.73m2    GFR est non-AA >60 >60 ml/min/1.73m2    Calcium 9.4 8.5 - 10.1 MG/DL    Bilirubin, total 0.2 0.2 - 1.0 MG/DL    ALT (SGPT) 25 12 - 78 U/L    AST (SGOT) 13 (L) 15 - 37 U/L    Alk. phosphatase 96 45 - 117 U/L    Protein, total 7.6 6.4 - 8.2 g/dL    Albumin 3.7 3.5 - 5.0 g/dL    Globulin 3.9 2.0 - 4.0 g/dL    A-G Ratio 0.9 (L) 1.1 - 2.2     CHLAMYDIA/GC PCR   Result Value Ref Range    Sample type URINE      Source URINE      Chlamydia amplified NEGATIVE  NEG      N. gonorrhea, amplified NEGATIVE  NEG      Comment        Testing performed by the [employer] Hernandez CT/NG method, utilizing PCR amplification to identify DNA of the pathogens. This method is not recommended as the sole method of evaluation of cases of sexual abuse nor for other medico-legal indications. HCG URINE, QL. - POC   Result Value Ref Range    Pregnancy test,urine (POC) NEGATIVE  NEG         Assessment/Plan:    ICD-10-CM ICD-9-CM    1. Well adult exam Z00.00 V70.0 SHAWN MAMMO BI SCREENING INCL CAD   2. Obesity, morbid ([address] E66.01 278.01 HEMOGLOBIN A1C WITH EAG   3. Obstructive sleep apnea syndrome G47.33 327.23    4. Breast cancer screening Z12.31 V76.10 SHAWN MAMMO BI SCREENING INCL CAD      fluconazole (DIFLUCAN) 150 mg tablet   5. Screen for STD (sexually transmitted disease) Z11.3 V74.5 HEPATITIS C AB      HIV 1/2 AG/AB, 4TH GENERATION,W RFLX CONFIRM      PAP LB, HPV-H+LR(091790)   6. Cervical cancer screening Z12.4 V76.2 PAP LB, HPV-H+LR([id number])   7. Elevated blood sugar R73.9 790.29 HEMOGLOBIN A1C WITH EAG   8. Vaginal candidiasis B37.3 112.1 HEMOGLOBIN A1C WITH EAG      fluconazole (DIFLUCAN) 150 mg tablet      DISCONTINUED: fluconazole (DIFLUCAN) 150 mg tablet   9. Acanthosis nigricans L83 701.2 HEMOGLOBIN A1C WITH EAG   10.  Encounter for immunization Z23 V03.89 TETANUS, DIPHTHERIA TOXOIDS AND ACELLULAR PERTUSSIS VACCINE (TDAP), IN INDIVIDS. >=7, IM   11. Insomnia, unspecified type G47.00 780.52 venlafaxine-SR (EFFEXOR-XR) 75 mg capsule      zolpidem (AMBIEN) 5 mg tablet   12. Essential hypertension I10 401.9      Orders Placed This Encounter    SHAWN MAMMO BI SCREENING INCL CAD     Standing Status:   Future     Standing Expiration Date:   5/20/2019     Order Specific Question:   Reason for Exam     Answer:   screening     Order Specific Question:   Is Patient Pregnant? Answer:   No    TETANUS, DIPHTHERIA TOXOIDS AND ACELLULAR PERTUSSIS VACCINE (TDAP), IN INDIVIDS. >=7, IM    HEPATITIS C AB    HIV 1/2 AG/AB, 4TH GENERATION,W RFLX CONFIRM    HEMOGLOBIN A1C WITH EAG    DISCONTD: fluconazole (DIFLUCAN) 150 mg tablet     Sig: Take 1 Tab by mouth daily for 1 day. FDA advises cautious prescribing of oral fluconazole in pregnancy. Indications: Vulvovaginal Candidiasis     Dispense:  1 Tab     Refill:  0    fluconazole (DIFLUCAN) 150 mg tablet     Sig: Take 1 Tab by mouth once for 1 dose. Indications: Vulvovaginal Candidiasis     Dispense:  1 Tab     Refill:  0    venlafaxine-SR (EFFEXOR-XR) 75 mg capsule     Sig: Take 1 Cap by mouth daily. Dispense:  30 Cap     Refill:  2    zolpidem (AMBIEN) 5 mg tablet     Sig: take 1 tablet by mouth at bedtime prn NOT TO BE USED EVERY NIGHT. IT IS A PRN medication     Dispense:  30 Tab     Refill:  1    PAP LB, HPV-H+LR([id number])     Order Specific Question:   Pap Source? Answer:   Endocervical     Order Specific Question:   Total Hysterectomy? Answer:   No     Order Specific Question:   Supracervical Hysterectomy? Answer:   No     Order Specific Question:   Post Menopausal?     Answer:   No     Order Specific Question:   Hormone Therapy? Answer:   No     Order Specific Question:   IUD? Answer:   No     Order Specific Question:   Abnormal Bleeding?      Answer:   No     Order Specific Question:   Pregnant     Answer:   No     Order Specific Question:   Post Partum? Answer:   No   1. Well adult exam    - [facility] MAMMO BI SCREENING INCL CAD; Future    2. Obesity, morbid ([address]  Will readdress @ [place]  - [facility] MAMMO BI SCREENING INCL CAD; Future  - HEPATITIS C AB  - HIV 1/2 AG/AB, 4TH GENERATION,W RFLX CONFIRM  - TETANUS, DIPHTHERIA TOXOIDS AND ACELLULAR PERTUSSIS VACCINE (TDAP), IN INDIVIDS. >=7, IM  - PAP LB, HPV-H+LR([id number])  - HEMOGLOBIN A1C WITH EAG  - fluconazole (DIFLUCAN) 150 mg tablet; Take 1 Tab by mouth once for 1 dose. Indications: Vulvovaginal Candidiasis  Dispense: 1 Tab; Refill: 0  - venlafaxine-SR (EFFEXOR-XR) 75 mg capsule; Take 1 Cap by mouth daily. Dispense: 30 Cap; Refill: 2  - zolpidem (AMBIEN) 5 mg tablet; take 1 tablet by mouth at bedtime prn NOT TO BE USED EVERY NIGHT. IT IS A PRN medication  Dispense: 30 Tab; Refill: 1    3. Obstructive sleep apnea syndrome  Noted    4. Breast cancer screening    - [facility] MAMMO BI SCREENING INCL CAD; Future      5. Screen for STD (sexually transmitted disease)    - HEPATITIS C AB  - HIV 1/2 AG/AB, 4TH GENERATION,W RFLX CONFIRM  - PAP LB, HPV-H+LR([id number])    6. Cervical cancer screening    - PAP LB, HPV-H+LR([id number])    7. Elevated blood sugar    - HEMOGLOBIN A1C WITH EAG    8. Vaginal candidiasis    - HEMOGLOBIN A1C WITH EAG  - fluconazole (DIFLUCAN) 150 mg tablet; Take 1 Tab by mouth once for 1 dose. Indications: Vulvovaginal Candidiasis  Dispense: 1 Tab; Refill: 0    9. Acanthosis nigricans    - HEMOGLOBIN A1C WITH EAG    10. Encounter for immunization    - TETANUS, DIPHTHERIA TOXOIDS AND ACELLULAR PERTUSSIS VACCINE (TDAP), IN INDIVIDS. >=7, IM    11. Insomnia, unspecified type    - zolpidem (AMBIEN) 5 mg tablet; take 1 tablet by mouth at bedtime prn NOT TO BE USED EVERY NIGHT. IT IS A PRN medication  Dispense: 30 Tab; Refill: 1    12HTN  ? compliance w meds  Recheck [place]  There are no Patient Instructions on file for this visit.    Follow-up Disposition:  Return in about 3 months (around 7/20/2018) for bp check. I have reviewed with the patient details of the assessment and plan and all questions were answered. Relevent patient education was performed. The most recent lab findings were reviewed with the patient. An After Visit Summary was printed and given to the patient.

## 2018-04-20 NOTE — PROGRESS NOTES
1. Have you been to the ER, urgent care clinic since your last visit? Hospitalized since your last visit? No    2. Have you seen or consulted any other health care providers outside of the [address] [place] [address] since your last visit? Include any pap smears or colon screening.  No.

## 2018-04-20 NOTE — MR AVS SNAPSHOT
[facility] [address] [facility] [address] 
[phone] Patient: Fatou Plata MRN: [id number] [facility]:0/40/7010 Visit Information Date & Time Provider Department Dept. Phone Encounter #  
 4/20/2018  8:45 AM Hugo Ramos  Karrie Patiño. [id number] Follow-up Instructions Return in about 3 months (around 7/20/2018) for bp check. Your Appointments 4/26/2018  2:20 PM  
ESTABLISHED PATIENT with MD Alvarez Florentino 33 [facility] ([facility] Appt Note: EP/ Review US results/$0CP/$0PB; EP    Review ultrasound results    vca  
 [address] [facility] [facility] [address]  
[phone]  
  
   
 [address]  
  
    
 7/25/2018  9:00 AM  
New Patient with Coco Riggins MD  
[address] ([facility] Appt Note: NP; referred by Dr. Ameena Castano; TRACI  
 [address] [facility], [address] [address] [address] [facility], [address] [address] Upcoming Health Maintenance Date Due Pneumococcal 19-64 Highest Risk (1 of 3 - PCV13) 3/13/1993 DTaP/Tdap/Td series (1 - Tdap) 3/13/1995 PAP AKA CERVICAL CYTOLOGY 3/13/1995 Influenza Age 5 to Adult 8/1/2017 BREAST CANCER SCRN MAMMOGRAM 10/18/2017 MEDICARE YEARLY EXAM 4/28/2018 Allergies as of 4/20/2018  Review Complete On: 4/20/2018 By: Nemo Goins LPN Severity Noted Reaction Type Reactions Norvasc [Amlodipine]  04/27/2017    Other (comments) Ankle swelling Current Immunizations  Never Reviewed Name Date Tdap  Incomplete Not reviewed this visit You Were Diagnosed With   
  
 Codes Comments Obesity, morbid ([facility] [address]    -  Primary ICD-10-CM: E66.01 
ICD-9-CM: 278.01  Obstructive sleep apnea syndrome     ICD-10-CM: G47.33 
 ICD-9-CM: 327.23 Breast cancer screening     ICD-10-CM: Z12.31 
ICD-9-CM: V76.10 Screen for STD (sexually transmitted disease)     ICD-10-CM: Z11.3 ICD-9-CM: V74.5 Cervical cancer screening     ICD-10-CM: Z12.4 ICD-9-CM: V76.2 Elevated blood sugar     ICD-10-CM: R73.9 ICD-9-CM: 790.29 Vaginal candidiasis     ICD-10-CM: B37.3 ICD-9-CM: 112.1 Acanthosis nigricans     ICD-10-CM: L83 
ICD-9-CM: 701.2 Encounter for immunization     ICD-10-CM: W17 ICD-9-CM: V03.89 Vitals BP Pulse Temp Resp Height(growth percentile) Weight(growth percentile) (!) 140/99 (!) 106 98.3 °F (36.8 °C) (Oral) 18 5' 4\" (1.626 m) 280 lb (127 kg) SpO2 BMI OB Status Smoking Status 100% 48.06 kg/m2 Having regular periods Current Every Day Smoker Vitals History BMI and BSA Data Body Mass Index Body Surface Area 48.06 kg/m 2 2.39 m 2 Preferred Pharmacy Pharmacy Name Phone [facility] [address] [place] [address] [phone] Your Updated Medication List  
  
   
This list is accurate as of 4/20/18  9:34 AM.  Always use your most recent med list.  
  
  
  
  
 ALPRAZolam 1 mg tablet Commonly known as:  XANAX  
take 1 tablet by mouth twice a day  
  
 fluconazole 150 mg tablet Commonly known as:  DIFLUCAN Take 1 Tab by mouth once for 1 dose. Indications: Vulvovaginal Candidiasis  
  
 ibuprofen 800 mg tablet Commonly known as:  MOTRIN Take 1 Tab by mouth every eight (8) hours as needed for Pain. lisinopril-hydroCHLOROthiazide 20-25 mg per tablet Commonly known as:  PRINZIDE, ZESTORETIC  
take 1 tablet by mouth once daily  
  
 venlafaxine-SR 75 mg capsule Commonly known as:  EFFEXOR-XR Take 1 Cap by mouth daily. zolpidem 5 mg tablet Commonly known as:  AMBIEN  
take 1 tablet by mouth at bedtime Prescriptions Sent to Pharmacy  Refills  
 fluconazole (DIFLUCAN) 150 mg tablet 0  
 Sig: Take 1 Tab by mouth once for 1 dose. Indications: Vulvovaginal Candidiasis Class: Normal  
 Pharmacy: [address] [place] [address], [address] [place] [address] [facility]  #: [phone] Route: Oral  
  
We Performed the Following HEMOGLOBIN A1C WITH EAG [85277 CPT(R)] HEPATITIS C AB [27771 CPT(R)] HIV 1/2 AG/AB, 4TH GENERATION,W RFLX CONFIRM S1628403 CPT(R)] PAP LB, HPV-H+LR(752275) [[id number] Custom] TETANUS, DIPHTHERIA TOXOIDS AND ACELLULAR PERTUSSIS VACCINE (TDAP), IN INDIVIDS. >=7, IM S8594087 CPT(R)] Follow-up Instructions Return in about 3 months (around 7/20/2018) for bp check. To-Do List   
 04/20/2018 Imaging:  [facility] BI SCREENING INCL CAD   
  
 04/20/2018 10:30 AM  
  Appointment with [address] [facility] 1 at [address] [place] [address] ([phone]) General  NPO DIET RESTICTIONS Please be NPO (nothing by mouth) for 6- 8 hours prior to procedure. GENERAL INSTRUCTIONS 1. Bring any non [facility] facility films/reports pertaining to the area being studied with you on the day of appointment. 2. A written order with a valid diagnosis and Physicians signature is required for all scheduled tests. 3. Check in at registration 30 minutes before your appointment time unless you were instructed to do otherwise. Introducing [facility] & HEALTH SERVICES! Puma Cedeno introduces [employer] patient portal. Now you can access parts of your medical record, email your doctor's office, and request medication refills online. 1. In your internet browser, go to [email or web address] 2. Click on the First Time User? Click Here link in the Sign In box. You will see the New Member Sign Up page. 3. Enter your [employer] Access Code exactly as it appears below. You will not need to use this code after youve completed the sign-up process. If you do not sign up before the expiration date, you must request a new code. · [employer] Access Code: [email or web address] Expires: 7/2/2018  5:32 AM 
 
4. Enter the last four digits of your Social Security Number (xxxx) and Date of Birth (mm/dd/yyyy) as indicated and click Submit. You will be taken to the next sign-up page. 5. Create a [employer] ID. This will be your [employer] login ID and cannot be changed, so think of one that is secure and easy to remember. 6. Create a [employer] password. You can change your password at any time. 7. Enter your Password Reset Question and Answer. This can be used at a later time if you forget your password. 8. Enter your e-mail address. You will receive e-mail notification when new information is available in [address] 9. Click Sign Up. You can now view and download portions of your medical record. 10. Click the Download Summary menu link to download a portable copy of your medical information. If you have questions, please visit the Frequently Asked Questions section of the [employer] website. Remember, [employer] is NOT to be used for urgent needs. For medical emergencies, dial 911. Now available from your iPhone and Android! Please provide this summary of care documentation to your next provider. Your primary care clinician is listed as Mai Bran. If you have any questions after today's visit, please call [phone].

## 2018-04-21 LAB
EST. AVERAGE GLUCOSE BLD GHB EST-MCNC: 126 MG/DL
HBA1C MFR BLD: 6 % (ref 4.8–5.6)
HCV AB S/CO SERPL IA: <0.1 S/CO RATIO (ref 0–0.9)
HIV 1+2 AB+HIV1 P24 AG SERPL QL IA: NON REACTIVE

## 2018-04-23 ENCOUNTER — HOSPITAL ENCOUNTER (OUTPATIENT)
Dept: ULTRASOUND IMAGING | Age: 43
Discharge: HOME | End: 2018-04-23
Attending: SURGERY
Payer: MEDICARE

## 2018-04-23 PROBLEM — R73.03 PREDIABETES: Status: ACTIVE | Noted: 2018-04-23

## 2018-04-23 PROCEDURE — 76705 ECHO EXAM OF ABDOMEN: CPT

## 2018-04-26 LAB
CYTOLOGIST CVX/VAG CYTO: NORMAL
DX ICD CODE: NORMAL
DX ICD CODE: NORMAL
HPV I/H RISK 1 DNA CVX QL PROBE+SIG AMP: NEGATIVE
HPV LOW RISK DNA CVX QL PROBE+SIG AMP: NEGATIVE
Lab: NORMAL
OTHER STN SPEC: NORMAL
PATH REPORT.FINAL DX SPEC: NORMAL
STAT OF ADQ CVX/VAG CYTO-IMP: NORMAL

## 2018-04-30 RX ORDER — ALPRAZOLAM 1 MG/1
TABLET ORAL
Qty: 60 TAB | Refills: 2 | OUTPATIENT
Start: 2018-04-30

## 2018-05-03 ENCOUNTER — OFFICE VISIT (OUTPATIENT)
Dept: SURGERY | Age: 43
End: 2018-05-03

## 2018-05-03 VITALS
TEMPERATURE: 98 F | RESPIRATION RATE: 18 BRPM | HEIGHT: 64 IN | HEART RATE: 120 BPM | WEIGHT: 280 LBS | OXYGEN SATURATION: 98 % | BODY MASS INDEX: 47.8 KG/M2

## 2018-05-03 DIAGNOSIS — E66.01 OBESITY, MORBID (HCC): Primary | ICD-10-CM

## 2018-05-03 NOTE — PROGRESS NOTES
HISTORY OF PRESENT ILLNESS  Constance SERG Bazzi is a 39 y.o. female who returns following abdominal ultrasound. HPI Comments: Ms. Dov Bazzi was last seen on 4/12/2018 for evaluation of a ventral hernia. Doing fairly well since then. No new complaints today. Review of systems negative except as noted. Review of Systems   Constitutional: Negative for chills and fever. Gastrointestinal: Positive for abdominal pain. Negative for nausea and vomiting. Physical Exam   Constitutional: No distress. Obese female. Abdominal: Soft. She exhibits mass (Fullness to left and inferior to umbilicus. ). She exhibits no distension. There is tenderness (To left and inferior to umbilicus. ). There is no rebound and no guarding. Musculoskeletal: Normal range of motion. Neurological: She is alert. Skin:   Well healed abdominal wall scars. Vitals reviewed. ASSESSMENT and PLAN  Reviewed recent abdominal ultrasound. Discussed ultrasound findings with Ms. Dov Bazzi today and reassured her that there was no evidence of an abdominal wall hernia and that at this point in time, there is no indication for further imaging or surgical intervention. Activity as tolerated. Follow up with Dr. Marcia Ceja as scheduled. Will see as needed.     CC: Abimbola Felix MD

## 2018-05-03 NOTE — MR AVS SNAPSHOT
[address] [facility] 
 
 
 [address] [facility] [address] [address] 
[phone] Patient: Brianna Hope MRN: [id number] ZDI:4/94/7859 Visit Information Date & Time Provider Department Dept. Phone Encounter #  
 5/3/2018  1:50 PM MD Geoffrey Andersonfranco [id number] [facility] [place] [id number] Your Appointments 7/25/2018  8:15 AM  
ROUTINE CARE with Yobany Pelayo MD  
[address] [place] [address] [facility] Appt Note: 3 month follow up on bp  
 [place] [address] [place] [address]  
[phone]  
  
   
 [address]  
  
    
 7/25/2018  9:00 AM  
New Patient with Paige Castañeda MD  
[address] [place] [address] ([facility]) Appt Note: NP; referred by Dr. Gael Pérez; TRACI  
 [address] [facility], [address] [address] [address] [place] [address], [address] [address] Upcoming Health Maintenance Date Due Pneumococcal 19-64 Highest Risk (1 of 3 - PCV13) 3/13/1993 BREAST CANCER SCRN MAMMOGRAM 10/18/2017 MEDICARE YEARLY EXAM 4/28/2018 Influenza Age 5 to Adult 8/1/2018 PAP AKA CERVICAL CYTOLOGY 4/20/2021 DTaP/Tdap/Td series (2 - Td) 4/20/2028 Allergies as of 5/3/2018  Review Complete On: 5/3/2018 By: Vic Malave MD  
  
 Severity Noted Reaction Type Reactions Norvasc [Amlodipine]  04/27/2017    Other (comments) Ankle swelling Current Immunizations  Never Reviewed Name Date Tdap 4/20/2018 Not reviewed this visit Vitals BP Pulse Temp Resp Height(growth percentile) Weight(growth percentile) (P) 118/68 (BP 1 Location: Left arm, BP Patient Position: Sitting) (!) 120 98 °F (36.7 °C) (Oral) 18 5' 4\" (1.626 m) 280 lb (127 kg) SpO2 BMI OB Status Smoking Status 98% 48.06 kg/m2 Having regular periods Current Every Day Smoker Vitals History BMI and BSA Data Body Mass Index Body Surface Area 48.06 kg/m 2 2.39 m 2 Preferred Pharmacy Pharmacy Name Phone [facility], [address] [place] [address] Antonella [phone] Your Updated Medication List  
  
   
This list is accurate as of 5/3/18  2:37 PM.  Always use your most recent med list.  
  
  
  
  
 ALPRAZolam 1 mg tablet Commonly known as:  XANAX  
take 1 tablet by mouth twice a day  
  
 ibuprofen 800 mg tablet Commonly known as:  MOTRIN Take 1 Tab by mouth every eight (8) hours as needed for Pain. lisinopril-hydroCHLOROthiazide 20-25 mg per tablet Commonly known as:  PRINZIDE, ZESTORETIC  
take 1 tablet by mouth once daily  
  
 venlafaxine-SR 75 mg capsule Commonly known as:  EFFEXOR-XR Take 1 Cap by mouth daily. zolpidem 5 mg tablet Commonly known as:  AMBIEN  
take 1 tablet by mouth at bedtime prn NOT TO BE USED EVERY NIGHT. IT IS A PRN medication Introducing [facility]! [facility] introduces [employer] patient portal. Now you can access parts of your medical record, email your doctor's office, and request medication refills online. 1. In your internet browser, go to [email or web address] 2. Click on the First Time User? Click Here link in the Sign In box. You will see the New Member Sign Up page. 3. Enter your [employer] Access Code exactly as it appears below. You will not need to use this code after youve completed the sign-up process. If you do not sign up before the expiration date, you must request a new code. · [employer] Access Code: [email or web address] Expires: 7/2/2018  5:32 AM 
 
4. Enter the last four digits of your Social Security Number (xxxx) and Date of Birth (mm/dd/yyyy) as indicated and click Submit. You will be taken to the next sign-up page. 5. Create a [employer] ID.  This will be your [employer] login ID and cannot be changed, so think of one that is secure and easy to remember. 6. Create a [employer] password. You can change your password at any time. 7. Enter your Password Reset Question and Answer. This can be used at a later time if you forget your password. 8. Enter your e-mail address. You will receive e-mail notification when new information is available in [address] 9. Click Sign Up. You can now view and download portions of your medical record. 10. Click the Download Summary menu link to download a portable copy of your medical information. If you have questions, please visit the Frequently Asked Questions section of the [employer] website. Remember, [employer] is NOT to be used for urgent needs. For medical emergencies, dial 911. Now available from your iPhone and Android! Please provide this summary of care documentation to your next provider. Your primary care clinician is listed as Asher Moya. If you have any questions after today's visit, please call [phone].

## 2018-05-24 ENCOUNTER — APPOINTMENT (OUTPATIENT)
Dept: GENERAL RADIOLOGY | Age: 43
End: 2018-05-24
Attending: PHYSICIAN ASSISTANT
Payer: MEDICARE

## 2018-05-24 ENCOUNTER — HOSPITAL ENCOUNTER (EMERGENCY)
Age: 43
Discharge: HOME | End: 2018-05-24
Attending: EMERGENCY MEDICINE
Payer: MEDICARE

## 2018-05-24 VITALS
TEMPERATURE: 98.2 F | OXYGEN SATURATION: 100 % | DIASTOLIC BLOOD PRESSURE: 57 MMHG | RESPIRATION RATE: 18 BRPM | HEIGHT: 64 IN | SYSTOLIC BLOOD PRESSURE: 113 MMHG | BODY MASS INDEX: 48.83 KG/M2 | HEART RATE: 81 BPM | WEIGHT: 286 LBS

## 2018-05-24 DIAGNOSIS — M54.9 UPPER BACK PAIN: ICD-10-CM

## 2018-05-24 DIAGNOSIS — J20.9 ACUTE BRONCHITIS, UNSPECIFIED ORGANISM: Primary | ICD-10-CM

## 2018-05-24 LAB
APPEARANCE UR: CLEAR
BACTERIA URNS QL MICRO: NEGATIVE /HPF
BILIRUB UR QL: NEGATIVE
COLOR UR: ABNORMAL
EPITH CASTS URNS QL MICRO: ABNORMAL /LPF
GLUCOSE UR STRIP.AUTO-MCNC: NEGATIVE MG/DL
HCG UR QL: NEGATIVE
HGB UR QL STRIP: NEGATIVE
KETONES UR QL STRIP.AUTO: NEGATIVE MG/DL
LEUKOCYTE ESTERASE UR QL STRIP.AUTO: ABNORMAL
NITRITE UR QL STRIP.AUTO: NEGATIVE
PH UR STRIP: 5.5 [PH] (ref 5–8)
PROT UR STRIP-MCNC: NEGATIVE MG/DL
RBC #/AREA URNS HPF: ABNORMAL /HPF (ref 0–5)
SP GR UR REFRACTOMETRY: <1.005 (ref 1–1.03)
UA: UC IF INDICATED,UAUC: ABNORMAL
UROBILINOGEN UR QL STRIP.AUTO: 0.2 EU/DL (ref 0.2–1)
WBC URNS QL MICRO: ABNORMAL /HPF (ref 0–4)

## 2018-05-24 PROCEDURE — 96372 THER/PROPH/DIAG INJ SC/IM: CPT

## 2018-05-24 PROCEDURE — 74011250636 HC RX REV CODE- 250/636: Performed by: PHYSICIAN ASSISTANT

## 2018-05-24 PROCEDURE — 99283 EMERGENCY DEPT VISIT LOW MDM: CPT

## 2018-05-24 PROCEDURE — 71046 X-RAY EXAM CHEST 2 VIEWS: CPT

## 2018-05-24 PROCEDURE — 81001 URINALYSIS AUTO W/SCOPE: CPT | Performed by: EMERGENCY MEDICINE

## 2018-05-24 PROCEDURE — 81025 URINE PREGNANCY TEST: CPT

## 2018-05-24 RX ORDER — BENZONATATE 200 MG/1
200 CAPSULE ORAL
Qty: 15 CAP | Refills: 0 | Status: SHIPPED | OUTPATIENT
Start: 2018-05-24 | End: 2018-05-29

## 2018-05-24 RX ORDER — AZITHROMYCIN 250 MG/1
TABLET, FILM COATED ORAL
Qty: 6 TAB | Refills: 0 | Status: SHIPPED | OUTPATIENT
Start: 2018-05-24 | End: 2018-09-10

## 2018-05-24 RX ORDER — DEXAMETHASONE SODIUM PHOSPHATE 100 MG/10ML
10 INJECTION INTRAMUSCULAR; INTRAVENOUS
Status: COMPLETED | OUTPATIENT
Start: 2018-05-24 | End: 2018-05-24

## 2018-05-24 RX ORDER — PREDNISONE 5 MG/1
TABLET ORAL
Qty: 21 TAB | Refills: 0 | Status: SHIPPED | OUTPATIENT
Start: 2018-05-24 | End: 2018-09-10

## 2018-05-24 RX ADMIN — DEXAMETHASONE SODIUM PHOSPHATE 10 MG: 10 INJECTION INTRAMUSCULAR; INTRAVENOUS at 09:57

## 2018-05-24 NOTE — ED PROVIDER NOTES
EMERGENCY DEPARTMENT HISTORY AND PHYSICAL EXAM    Date: 5/24/2018  Patient Name: Nubia Villareal    History of Presenting Illness     Chief Complaint   Patient presents with    Flank Pain         History Provided By: Patient    HPI: Nubia Villareal is a 39 y.o. female with a PMH of sleep apnea, anxiety, depression, obesity, HTN, insomnia, prediabetes who presents with complaints of a cough and flank pain X2 wks. Pt states her symptoms have fluctuated but have worsened since last night. The flank pain is bilateral and sharp that comes and goes. Associated symptoms include SOB, myalgias, fever, nausea, sharp abdominal pain, and decreased urine production. Denies vomiting, dysuria, HA, blurred vision. Pt has tried Robitussin and mucinex without relief. Pt rates pain 7/10 and aching. Pt is also a smoker. PCP: Dora Zavaleta MD    Current Facility-Administered Medications   Medication Dose Route Frequency Provider Last Rate Last Dose    dexamethasone (DECADRON) injection 10 mg  10 mg IntraMUSCular NOW Hansa Villafuerte PA-C         Current Outpatient Prescriptions   Medication Sig Dispense Refill    azithromycin (ZITHROMAX) 250 mg tablet Take two tablets today then one tablet daily 6 Tab 0    predniSONE (STERAPRED) 5 mg dose pack See administration instruction per 5mg dose pack 21 Tab 0    benzonatate (TESSALON) 200 mg capsule Take 1 Cap by mouth three (3) times daily as needed for Cough for up to 5 days. 15 Cap 0    venlafaxine-SR (EFFEXOR-XR) 75 mg capsule Take 1 Cap by mouth daily. 30 Cap 2    zolpidem (AMBIEN) 5 mg tablet take 1 tablet by mouth at bedtime prn NOT TO BE USED EVERY NIGHT. IT IS A PRN medication 30 Tab 1    lisinopril-hydroCHLOROthiazide (PRINZIDE, ZESTORETIC) 20-25 mg per tablet take 1 tablet by mouth once daily 60 Tab 1    ibuprofen (MOTRIN) 800 mg tablet Take 1 Tab by mouth every eight (8) hours as needed for Pain.  30 Tab 0    ALPRAZolam (XANAX) 1 mg tablet take 1 tablet by mouth twice a day 61 Tab 2       Past History     Past Medical History:  Past Medical History:   Diagnosis Date    Hx MRSA infection     Hypertension     Infectious disease     MRSA    Neurological disorder     right wrist carpal tunnel    Other ill-defined conditions(799.89)     Chronic Back pain     Psychiatric disorder     depression    Ventral hernia without obstruction or gangrene 2018       Past Surgical History:  Past Surgical History:   Procedure Laterality Date    HX CARPAL TUNNEL RELEASE      right wrist    HX  SECTION      x3    HX CHOLECYSTECTOMY      HX HERNIA REPAIR      3/2011       Family History:  Family History   Problem Relation Age of Onset    Lung Disease Mother      sarcoidosis    Diabetes Father     Kidney Disease Father     Pacemaker Father        Social History:  Social History   Substance Use Topics    Smoking status: Current Every Day Smoker     Packs/day: 1.50    Smokeless tobacco: Never Used    Alcohol use Yes      Comment: Occasionlly - holidays       Allergies: Allergies   Allergen Reactions    Norvasc [Amlodipine] Other (comments)     Ankle swelling         Review of Systems   Review of Systems   Constitutional: Positive for chills and fever. HENT: Positive for congestion. Respiratory: Positive for chest tightness (when coughing) and shortness of breath. Cardiovascular: Negative for chest pain. Gastrointestinal: Positive for abdominal pain and nausea. Negative for constipation, diarrhea and vomiting. Genitourinary: Positive for decreased urine volume and flank pain (bilateral). Negative for dysuria. Musculoskeletal: Positive for myalgias. Skin: Negative for color change. Neurological: Negative for dizziness, speech difficulty, light-headedness and headaches. All other systems reviewed and are negative.       Physical Exam     Vitals:    18 0840   BP: 113/57   Pulse: 81   Resp: 18   Temp: 98.2 °F (36.8 °C)   SpO2: 100% Weight: 129.7 kg (286 lb)   Height: 5' 4\" (1.626 m)     Physical Exam   Constitutional: She is oriented to person, place, and time. Vital signs are normal. She appears well-developed and well-nourished. Non-toxic appearance. She does not have a sickly appearance. She does not appear ill. No distress. HENT:   Head: Normocephalic and atraumatic. Right Ear: Hearing normal.   Left Ear: Hearing normal.   Neck: Normal range of motion. Neck supple. Cardiovascular: Normal rate, regular rhythm, S1 normal, S2 normal and normal heart sounds. Pulmonary/Chest: Effort normal and breath sounds normal. No accessory muscle usage. No respiratory distress. She has no decreased breath sounds. She has no wheezes. She has no rhonchi. She has no rales. She exhibits no tenderness. Abdominal: Soft. Normal appearance and bowel sounds are normal. She exhibits no shifting dullness, no distension, no fluid wave, no ascites and no mass. There is tenderness in the right upper quadrant and epigastric area. There is rebound (generalized). There is no CVA tenderness. Musculoskeletal: Normal range of motion. Thoracic back: She exhibits pain. Back:    Neurological: She is alert and oriented to person, place, and time. Coordination and gait normal. GCS eye subscore is 4. GCS verbal subscore is 5. GCS motor subscore is 6. Skin: Skin is warm, dry and intact. She is not diaphoretic. Psychiatric: She has a normal mood and affect.  Her speech is normal and behavior is normal. Judgment and thought content normal. Cognition and memory are normal.   at 9:55 AM    Diagnostic Study Results     Labs -     Recent Results (from the past 12 hour(s))   HCG URINE, QL. - POC    Collection Time: 05/24/18  8:56 AM   Result Value Ref Range    Pregnancy test,urine (POC) NEGATIVE  NEG     URINALYSIS W/ REFLEX CULTURE    Collection Time: 05/24/18  8:57 AM   Result Value Ref Range    Color YELLOW/STRAW      Appearance CLEAR CLEAR      Specific gravity <1.005 1.003 - 1.030    pH (UA) 5.5 5.0 - 8.0      Protein NEGATIVE  NEG mg/dL    Glucose NEGATIVE  NEG mg/dL    Ketone NEGATIVE  NEG mg/dL    Bilirubin NEGATIVE  NEG      Blood NEGATIVE  NEG      Urobilinogen 0.2 0.2 - 1.0 EU/dL    Nitrites NEGATIVE  NEG      Leukocyte Esterase TRACE (A) NEG      WBC 0-4 0 - 4 /hpf    RBC 0-5 0 - 5 /hpf    Epithelial cells FEW FEW /lpf    Bacteria NEGATIVE  NEG /hpf    UA:UC IF INDICATED CULTURE NOT INDICATED BY UA RESULT CNI         Radiologic Studies -   XR CHEST PA LAT   Final Result        CT Results  (Last 48 hours)    None        CXR Results  (Last 48 hours)               05/24/18 0926  XR CHEST PA LAT Final result    Impression:  IMPRESSION: No acute abnormality identified                       Narrative:  EXAM:  XR CHEST PA LAT       INDICATION:   cough x 2 wks       COMPARISON: 2011. FINDINGS: PA and lateral radiographs of the chest demonstrate clear lungs. The   cardiac and mediastinal contours and pulmonary vascularity are normal.  There is   degenerative spurring mid thoracic spine. Medical Decision Making   I am the first provider for this patient. I reviewed the vital signs, available nursing notes, past medical history, past surgical history, family history and social history. Vital Signs-Reviewed the patient's vital signs. Records Reviewed: Old Medical Records    Disposition:  Discharged    DISCHARGE NOTE:   9:55 AM      Care plan outlined and precautions discussed. Patient has no new complaints, changes, or physical findings. Results of CXR were reviewed with the patient. All medications were reviewed with the patient; will d/c home. All of pt's questions and concerns were addressed. Patient was instructed and agrees to follow up with PCP prn, as well as to return to the ED upon further deterioration. Patient is ready to go home.     Follow-up Information     Follow up With Details Comments Contact Info    Carrie HERNÁNDEZ Kaylyn Townsend MD Schedule an appointment as soon as possible for a visit on 5/28/2018 As needed [address]  [address]  [phone]            Current Discharge Medication List      START taking these medications    Details   azithromycin (ZITHROMAX) 250 mg tablet Take two tablets today then one tablet daily  Qty: 6 Tab, Refills: 0      predniSONE (STERAPRED) 5 mg dose pack See administration instruction per 5mg dose pack  Qty: 21 Tab, Refills: 0      benzonatate (TESSALON) 200 mg capsule Take 1 Cap by mouth three (3) times daily as needed for Cough for up to 5 days. Qty: 15 Cap, Refills: 0         CONTINUE these medications which have NOT CHANGED    Details   venlafaxine-SR (EFFEXOR-XR) 75 mg capsule Take 1 Cap by mouth daily. Qty: 30 Cap, Refills: 2    Associated Diagnoses: Insomnia, unspecified type      zolpidem (AMBIEN) 5 mg tablet take 1 tablet by mouth at bedtime prn NOT TO BE USED EVERY NIGHT. IT IS A PRN medication  Qty: 30 Tab, Refills: 1    Associated Diagnoses: Insomnia, unspecified type      lisinopril-hydroCHLOROthiazide (PRINZIDE, ZESTORETIC) 20-25 mg per tablet take 1 tablet by mouth once daily  Qty: 60 Tab, Refills: 1    Associated Diagnoses: Essential hypertension      ibuprofen (MOTRIN) 800 mg tablet Take 1 Tab by mouth every eight (8) hours as needed for Pain. Qty: 30 Tab, Refills: 0      ALPRAZolam (XANAX) 1 mg tablet take 1 tablet by mouth twice a day  Qty: 60 Tab, Refills: 2             Provider Notes (Medical Decision Making):   DDx: pyelonephritis, cystitis, pneumonia, bronchitis      Procedures        Diagnosis     Clinical Impression:   1. Acute bronchitis, unspecified organism    2.  Upper back pain

## 2018-05-24 NOTE — ED TRIAGE NOTES
Patient complains of shortness of breath, congestion, flank pain, and productive cough x 7 days. No shortness of breath and able to speak in complete sentences in triage.

## 2018-05-24 NOTE — ED NOTES
Pt presents to ED with c/o left lower rib pain for several days. Reports persistent, dry cough. Lungs sounds TICO CTA. No adventitious lung sounds with auscultation. Emergency Department Nursing Plan of Care       The Nursing Plan of Care is developed from the Nursing assessment and Emergency Department Attending provider initial evaluation. The plan of care may be reviewed in the ED Provider note.     The Plan of Care was developed with the following considerations:   Patient / Family readiness to learn indicated by:verbalized understanding  Persons(s) to be included in education: patient  Barriers to Learning/Limitations:No    Signed     Michaelle Gallego RN    5/24/2018   10:02 AM

## 2018-05-24 NOTE — DISCHARGE INSTRUCTIONS
Bronchitis: Care Instructions  Your Care Instructions    Bronchitis is inflammation of the bronchial tubes, which carry air to the lungs. The tubes swell and produce mucus, or phlegm. The mucus and inflamed bronchial tubes make you cough. You may have trouble breathing. Most cases of bronchitis are caused by viruses like those that cause colds. Antibiotics usually do not help and they may be harmful. Bronchitis usually develops rapidly and lasts about 2 to 3 weeks in otherwise healthy people. Follow-up care is a key part of your treatment and safety. Be sure to make and go to all appointments, and call your doctor if you are having problems. It's also a good idea to know your test results and keep a list of the medicines you take. How can you care for yourself at home? · Take all medicines exactly as prescribed. Call your doctor if you think you are having a problem with your medicine. · Get some extra rest.  · Take an over-the-counter pain medicine, such as acetaminophen (Tylenol), ibuprofen (Advil, Motrin), or naproxen (Aleve) to reduce fever and relieve body aches. Read and follow all instructions on the label. · Do not take two or more pain medicines at the same time unless the doctor told you to. Many pain medicines have acetaminophen, which is Tylenol. Too much acetaminophen (Tylenol) can be harmful. · Take an over-the-counter cough medicine that contains dextromethorphan to help quiet a dry, hacking cough so that you can sleep. Avoid cough medicines that have more than one active ingredient. Read and follow all instructions on the label. · Breathe moist air from a humidifier, hot shower, or sink filled with hot water. The heat and moisture will thin mucus so you can cough it out. · Do not smoke. Smoking can make bronchitis worse. If you need help quitting, talk to your doctor about stop-smoking programs and medicines. These can increase your chances of quitting for good.   When should you call for help? Call 911 anytime you think you may need emergency care. For example, call if:  ? · You have severe trouble breathing. ?Call your doctor now or seek immediate medical care if:  ? · You have new or worse trouble breathing. ? · You cough up dark brown or bloody mucus (sputum). ? · You have a new or higher fever. ? · You have a new rash. ? Watch closely for changes in your health, and be sure to contact your doctor if:  ? · You cough more deeply or more often, especially if you notice more mucus or a change in the color of your mucus. ? · You are not getting better as expected. Where can you learn more? Go to http://abi-clifford.info/. Enter H333 in the search box to learn more about \"Bronchitis: Care Instructions. \"  Current as of: May 12, 2017  Content Version: 11.4  © 6446-7475 [employer]. Care instructions adapted under license by [employer] (which disclaims liability or warranty for this information). If you have questions about a medical condition or this instruction, always ask your healthcare professional. [address] any warranty or liability for your use of this information.

## 2018-05-24 NOTE — ED NOTES
Reviewed discharge instructions, follow up information and prescriptions with patient. Ambulatory independently out of ED. Declined work excuse. discharged home with spouse.

## 2018-06-05 DIAGNOSIS — G47.00 INSOMNIA, UNSPECIFIED TYPE: ICD-10-CM

## 2018-06-06 RX ORDER — VENLAFAXINE HYDROCHLORIDE 75 MG/1
CAPSULE, EXTENDED RELEASE ORAL
Qty: 30 CAP | Refills: 2 | Status: SHIPPED | OUTPATIENT
Start: 2018-06-06 | End: 2018-09-10 | Stop reason: SDUPTHER

## 2018-09-10 ENCOUNTER — OFFICE VISIT (OUTPATIENT)
Dept: INTERNAL MEDICINE CLINIC | Age: 43
End: 2018-09-10

## 2018-09-10 VITALS
SYSTOLIC BLOOD PRESSURE: 120 MMHG | BODY MASS INDEX: 49.13 KG/M2 | DIASTOLIC BLOOD PRESSURE: 80 MMHG | TEMPERATURE: 99 F | HEIGHT: 64 IN | WEIGHT: 287.8 LBS | HEART RATE: 87 BPM | OXYGEN SATURATION: 98 % | RESPIRATION RATE: 18 BRPM

## 2018-09-10 DIAGNOSIS — G44.229 CHRONIC TENSION-TYPE HEADACHE, NOT INTRACTABLE: ICD-10-CM

## 2018-09-10 DIAGNOSIS — G47.00 INSOMNIA, UNSPECIFIED TYPE: ICD-10-CM

## 2018-09-10 DIAGNOSIS — G47.33 OBSTRUCTIVE SLEEP APNEA SYNDROME: ICD-10-CM

## 2018-09-10 DIAGNOSIS — R73.03 PREDIABETES: ICD-10-CM

## 2018-09-10 DIAGNOSIS — E66.01 OBESITY, MORBID (HCC): ICD-10-CM

## 2018-09-10 DIAGNOSIS — Z23 ENCOUNTER FOR IMMUNIZATION: ICD-10-CM

## 2018-09-10 DIAGNOSIS — I10 ESSENTIAL HYPERTENSION: Primary | ICD-10-CM

## 2018-09-10 RX ORDER — VENLAFAXINE HYDROCHLORIDE 75 MG/1
CAPSULE, EXTENDED RELEASE ORAL
Qty: 30 CAP | Refills: 2 | Status: SHIPPED | OUTPATIENT
Start: 2018-09-10 | End: 2019-05-05 | Stop reason: SDUPTHER

## 2018-09-10 RX ORDER — IBUPROFEN 800 MG/1
800 TABLET ORAL
Qty: 30 TAB | Refills: 0 | Status: SHIPPED | OUTPATIENT
Start: 2018-09-10 | End: 2022-06-01

## 2018-09-10 RX ORDER — CHLORTHALIDONE 50 MG/1
50 TABLET ORAL DAILY
Qty: 60 TAB | Refills: 5 | Status: SHIPPED | OUTPATIENT
Start: 2018-09-10 | End: 2020-02-14 | Stop reason: SDUPTHER

## 2018-09-10 NOTE — PROGRESS NOTES
Chief Complaint   Patient presents with    Headache     1. Have you been to the ER, urgent care clinic since your last visit? Hospitalized since your last visit? No    2. Have you seen or consulted any other health care providers outside of the [facility] since your last visit? Include any pap smears or colon screening. No    After obtaining consent, and per orders of Dr. Miesha Romero, injection of Influenza given by Daren Naidu LPN. Patient instructed to remain in clinic for 15 minutes afterwards, and to report any adverse reaction to me immediately.

## 2019-04-19 ENCOUNTER — HOSPITAL ENCOUNTER (EMERGENCY)
Age: 44
Discharge: HOME | End: 2019-04-19
Attending: EMERGENCY MEDICINE
Payer: MEDICARE

## 2019-04-19 VITALS
SYSTOLIC BLOOD PRESSURE: 142 MMHG | BODY MASS INDEX: 46.1 KG/M2 | HEIGHT: 64 IN | HEART RATE: 112 BPM | DIASTOLIC BLOOD PRESSURE: 88 MMHG | RESPIRATION RATE: 20 BRPM | TEMPERATURE: 97.8 F | OXYGEN SATURATION: 98 % | WEIGHT: 270 LBS

## 2019-04-19 DIAGNOSIS — M25.512 PAIN IN JOINT OF LEFT SHOULDER: Primary | ICD-10-CM

## 2019-04-19 DIAGNOSIS — I10 ESSENTIAL HYPERTENSION: ICD-10-CM

## 2019-04-19 PROCEDURE — A4565 SLINGS: HCPCS

## 2019-04-19 PROCEDURE — 74011250637 HC RX REV CODE- 250/637: Performed by: EMERGENCY MEDICINE

## 2019-04-19 PROCEDURE — 99283 EMERGENCY DEPT VISIT LOW MDM: CPT

## 2019-04-19 RX ORDER — CHLORTHALIDONE 25 MG/1
100 TABLET ORAL
Status: COMPLETED | OUTPATIENT
Start: 2019-04-19 | End: 2019-04-19

## 2019-04-19 RX ORDER — IBUPROFEN 400 MG/1
800 TABLET ORAL
Status: COMPLETED | OUTPATIENT
Start: 2019-04-19 | End: 2019-04-19

## 2019-04-19 RX ORDER — METHOCARBAMOL 500 MG/1
500 TABLET, FILM COATED ORAL 4 TIMES DAILY
Qty: 30 TAB | Refills: 0 | Status: SHIPPED | OUTPATIENT
Start: 2019-04-19 | End: 2022-06-01

## 2019-04-19 RX ORDER — IBUPROFEN 800 MG/1
800 TABLET ORAL
Qty: 20 TAB | Refills: 0 | Status: SHIPPED | OUTPATIENT
Start: 2019-04-19 | End: 2019-04-26

## 2019-04-19 RX ADMIN — CHLORTHALIDONE 100 MG: 25 TABLET ORAL at 10:06

## 2019-04-19 RX ADMIN — IBUPROFEN 800 MG: 400 TABLET ORAL at 10:06

## 2019-04-19 NOTE — ED NOTES
Emergency Department Nursing Plan of Care The Nursing Plan of Care is developed from the Nursing assessment and Emergency Department Attending provider initial evaluation. The plan of care may be reviewed in the ED Provider note. The Plan of Care was developed with the following considerations:  
Patient / Family readiness to learn indicated by:verbalized understanding Persons(s) to be included in education: patient Barriers to Learning/Limitations:No 
 
Signed Carmen Barry 4/19/2019   9:51 AM

## 2019-04-19 NOTE — ED PROVIDER NOTES
Patient Name: Malena Ramirez History of Presenting Illness Chief Complaint Patient presents with  Shoulder Pain History Provided By: Patient HPI: Malena Ramirez, 38 y.o. female with PMHx significant for HTN, presents ambualtory to the ED with cc of new onset of left shoulder pain beginning 16 Apr 2019. Pt rates current shoulder pain as 8/10 and sharp in nature. Pt denies lifting anything heavy or any recent trauma/injury to her shoulder. Pt notes pain worsens with ROM and adds that when she lifts her arm there is a pulling sensation within. Pt specifically denies SOB and CP. Social Hx: + Tobacco, + EtOH, - Illicit Drugs There are no other complaints, changes, or physical findings at this time. PCP: Sydnee Cooper MD 
 
Current Outpatient Medications Medication Sig Dispense Refill  ibuprofen (MOTRIN) 800 mg tablet Take 1 Tab by mouth every six (6) hours as needed for Pain for up to 7 days. 20 Tab 0  
 methocarbamol (ROBAXIN) 500 mg tablet Take 1 Tab by mouth four (4) times daily. 30 Tab 0  chlorthalidone (HYGROTEN) 50 mg tablet Take 1 Tab by mouth daily. Indications: hypertension 60 Tab 5  ibuprofen (MOTRIN) 800 mg tablet Take 1 Tab by mouth every eight (8) hours as needed for Pain. Take w food 30 Tab 0  
 venlafaxine-SR (EFFEXOR-XR) 75 mg capsule take 1 capsule by mouth once daily 30 Cap 2  
 zolpidem (AMBIEN) 5 mg tablet take 1 tablet by mouth at bedtime prn NOT TO BE USED EVERY NIGHT. IT IS A PRN medication 30 Tab 1  ALPRAZolam (XANAX) 1 mg tablet take 1 tablet by mouth twice a day 60 Tab 2 Past History Past Medical History: 
Past Medical History:  
Diagnosis Date  Hx MRSA infection  Hypertension  Infectious disease MRSA  Neurological disorder   
 right wrist carpal tunnel  Other ill-defined conditions(662.19) Chronic Back pain  Psychiatric disorder   
 depression  Ventral hernia without obstruction or gangrene 2018 Past Surgical History: 
Past Surgical History:  
Procedure Laterality Date  HX CARPAL TUNNEL RELEASE    
 right wrist  
 HX  SECTION    
 x3  
 HX CHOLECYSTECTOMY  HX HERNIA REPAIR    
 3/2011 Family History: 
Family History Problem Relation Age of Onset  Lung Disease Mother   
     sarcoidosis  Diabetes Father  Kidney Disease Father  Pacemaker Father Social History: 
Social History Tobacco Use  Smoking status: Current Every Day Smoker Packs/day: 1.50  Smokeless tobacco: Never Used Substance Use Topics  Alcohol use: Yes Comment: Occasionlly - holidays  Drug use: No  
 
 
Allergies: Allergies Allergen Reactions  Lisinopril Cough and Angioedema  Norvasc [Amlodipine] Other (comments) Ankle swelling Review of Systems Review of Systems Constitutional: Negative for fever. HENT: Negative for sore throat. Eyes: Negative for photophobia and redness. Respiratory: Negative for shortness of breath and wheezing. Cardiovascular: Negative for chest pain and leg swelling. Gastrointestinal: Negative for abdominal pain, blood in stool, nausea and vomiting. Genitourinary: Negative for difficulty urinating, dysuria, hematuria, menstrual problem and vaginal bleeding. Musculoskeletal: Positive for arthralgias (left shoulder). Negative for back pain and joint swelling. Neurological: Negative for dizziness, seizures, syncope, speech difficulty, weakness, numbness and headaches. Hematological: Negative for adenopathy. Psychiatric/Behavioral: Negative for agitation, confusion and suicidal ideas. The patient is not nervous/anxious. Physical Exam  
Physical Exam  
Constitutional: She is oriented to person, place, and time. She appears well-developed and well-nourished. No distress. HENT:  
Head: Normocephalic and atraumatic. Mouth/Throat: Oropharynx is clear and moist. No oropharyngeal exudate. Eyes: Pupils are equal, round, and reactive to light. Conjunctivae and EOM are normal. Left eye exhibits no discharge. Neck: Normal range of motion. Neck supple. No JVD present. Cardiovascular: Normal rate, regular rhythm, normal heart sounds and intact distal pulses. Pulmonary/Chest: Effort normal and breath sounds normal. No respiratory distress. She has no wheezes. Abdominal: Soft. Bowel sounds are normal. She exhibits no distension. There is no tenderness. There is no rebound and no guarding. Musculoskeletal: She exhibits no edema or tenderness. Painful ROM of left shoulder Lymphadenopathy:  
  She has no cervical adenopathy. Neurological: She is alert and oriented to person, place, and time. She has normal reflexes. No cranial nerve deficit. Skin: Skin is warm and dry. No rash noted. Psychiatric: She has a normal mood and affect. Her behavior is normal.  
Nursing note and vitals reviewed. Diagnostic Study Results Labs - No results found for this or any previous visit (from the past 12 hour(s)). Radiologic Studies - No orders to display CT Results  (Last 48 hours) None CXR Results  (Last 48 hours) None Medical Decision Making I am the first provider for this patient. I reviewed the vital signs, available nursing notes, past medical history, past surgical history, family history and social history. Vital Signs-Reviewed the patient's vital signs. Patient Vitals for the past 12 hrs: 
 Temp Pulse Resp BP SpO2  
04/19/19 1001    142/88 98 % 04/19/19 0942 97.8 °F (36.6 °C) (!) 112 20 (!) 151/128 99 % Pulse Oximetry Analysis - 99% on RA Cardiac Monitor:  
Rate: 112 bpm 
Rhythm: Sinus Tachycardia Records Reviewed: Nursing Notes and Old Medical Records Provider Notes (Medical Decision Making):  
 DDx: shoulder bursitis, left shoulder DJD, pronator cuff injury, uncontrolled HTN 
 
ED Course:  
Initial assessment performed. The patients presenting problems have been discussed, and they are in agreement with the care plan formulated and outlined with them. I have encouraged them to ask questions as they arise throughout their visit. Tobacco Counseling Discussed the risks of smoking and the benefits of smoking cessation as well as the long term sequelae of smoking with the patient. The patient verbalized their understanding. Counseled patient for approximately 3 minutes. PROGRESS NOTE: 
10:20 AM 
Pt states shoulder feels much better and is ready for discharge. Written by Otf Jeff ED Scribe as dictated by Sherren Dyke, MD 
 
Critical Care Time: 0 minutes Disposition: 
Discharge Note: 
10:20 AM 
The pt is ready for discharge. The pt's signs, symptoms, diagnosis, and discharge instructions have been discussed and pt has conveyed their understanding. The pt is to follow up as recommended or return to ER should their symptoms worsen. Plan has been discussed and pt is in agreement. PLAN: 
1. Discharge Home Current Discharge Medication List  
  
START taking these medications Details  
!! ibuprofen (MOTRIN) 800 mg tablet Take 1 Tab by mouth every six (6) hours as needed for Pain for up to 7 days. Qty: 20 Tab, Refills: 0  
  
methocarbamol (ROBAXIN) 500 mg tablet Take 1 Tab by mouth four (4) times daily. Qty: 30 Tab, Refills: 0  
  
 !! - Potential duplicate medications found. Please discuss with provider. CONTINUE these medications which have NOT CHANGED Details  
!! ibuprofen (MOTRIN) 800 mg tablet Take 1 Tab by mouth every eight (8) hours as needed for Pain. Take w food 
Qty: 30 Tab, Refills: 0 Associated Diagnoses: Chronic tension-type headache, not intractable  
  
 !! - Potential duplicate medications found. Please discuss with provider. 2.  
Follow-up Information Follow up With Specialties Details Why Contact Info Damaris Dong MD Internal Medicine In 1 week  [address] [address] [place] [id number] [phone] Return to ED if worse Diagnosis Clinical Impression: 1. Pain in joint of left shoulder 2. Essential hypertension Attestations: This note is prepared by Krystian Warner, acting as Scribe for Camila Rocha MD. 
 
The scribe's documentation has been prepared under my direction and personally reviewed by me in its entirety. I confirm that the note above accurately reflects all work, treatment, procedures, and medical decision making performed by me.  
Camila Rocha MD

## 2019-04-19 NOTE — DISCHARGE INSTRUCTIONS
Patient Education        Home Blood Pressure Test: About This Test  What is it? A home blood pressure test allows you to keep track of your blood pressure at home. Blood pressure is a measure of the force of blood against the walls of your arteries. Blood pressure readings include two numbers, such as 130/80 (say \"130 over 80\"). The first number is the systolic pressure. The second number is the diastolic pressure. Why is this test done? You may do this test at home to:  · Find out if you have high blood pressure. · Track your blood pressure if you have high blood pressure. · Track how well medicine is working to reduce high blood pressure. · Check how lifestyle changes, such as weight loss and exercise, are affecting blood pressure. How can you prepare for the test?  · Do not use caffeine, tobacco, or medicines known to raise blood pressure (such as nasal decongestant sprays) for at least 30 minutes before taking your blood pressure. · Do not exercise for at least 30 minutes before taking your blood pressure. What happens before the test?  Take your blood pressure while you feel comfortable and relaxed. Sit quietly with both feet on the floor for at least 5 minutes before the test.  What happens during the test?  · Sit with your arm slightly bent and resting on a table so that your upper arm is at the same level as your heart. · Roll up your sleeve or take off your shirt to expose your upper arm. · Wrap the blood pressure cuff around your upper arm so that the lower edge of the cuff is about 1 inch above the bend of your elbow. Proceed with the following steps depending on if you are using an automatic or manual pressure monitor. Automatic blood pressure monitors  · Press the on/off button on the automatic monitor and wait until the ready-to-measure \"heart\" symbol appears next to zero in the display window. · Press the start button. The cuff will inflate and deflate by itself.   · Your blood pressure numbers will appear on the screen. · Write your numbers in your log book, along with the date and time. Manual blood pressure monitors  · Place the earpieces of a stethoscope in your ears, and place the bell of the stethoscope over the artery, just below the cuff. · Close the valve on the rubber inflating bulb. · Squeeze the bulb rapidly with your opposite hand to inflate the cuff until the dial or column of mercury reads about 30 mm Hg higher than your usual systolic pressure. If you do not know your usual pressure, inflate the cuff to 210 mm Hg or until the pulse at your wrist disappears. · Open the pressure valve just slightly by twisting or pressing the valve on the bulb. · As you watch the pressure slowly fall, note the level on the dial at which you first start to hear a pulsing or tapping sound through the stethoscope. This is your systolic blood pressure. · Continue letting the air out slowly. The sounds will become muffled and will finally disappear. Note the pressure when the sounds completely disappear. This is your diastolic blood pressure. Let out all the remaining air. · Write your numbers in your log book, along with the date and time. What else should you know about the test?  It is more accurate to take the average of several readings made throughout the day than to rely on a single reading. It's normal for blood pressure to go up and down throughout the day. Follow-up care is a key part of your treatment and safety. Be sure to make and go to all appointments, and call your doctor if you are having problems. It's also a good idea to keep a list of the medicines you take. Where can you learn more? Go to http://abi-clifford.info/. Enter C427 in the search box to learn more about \"Home Blood Pressure Test: About This Test.\"  Current as of: July 22, 2018  Content Version: 11.9  © 9852-5142 [employer].  Care instructions adapted under license by [employer] (which disclaims liability or warranty for this information). If you have questions about a medical condition or this instruction, always ask your healthcare professional. [address] any warranty or liability for your use of this information.

## 2019-05-04 ENCOUNTER — APPOINTMENT (OUTPATIENT)
Dept: GENERAL RADIOLOGY | Age: 44
End: 2019-05-04
Attending: NURSE PRACTITIONER
Payer: MEDICARE

## 2019-05-04 ENCOUNTER — HOSPITAL ENCOUNTER (EMERGENCY)
Age: 44
Discharge: HOME | End: 2019-05-04
Attending: EMERGENCY MEDICINE
Payer: MEDICARE

## 2019-05-04 VITALS
HEART RATE: 90 BPM | HEIGHT: 64 IN | DIASTOLIC BLOOD PRESSURE: 88 MMHG | OXYGEN SATURATION: 98 % | BODY MASS INDEX: 46.1 KG/M2 | WEIGHT: 270 LBS | RESPIRATION RATE: 17 BRPM | SYSTOLIC BLOOD PRESSURE: 145 MMHG | TEMPERATURE: 98.2 F

## 2019-05-04 DIAGNOSIS — M25.512 ACUTE PAIN OF LEFT SHOULDER: Primary | ICD-10-CM

## 2019-05-04 PROCEDURE — 99283 EMERGENCY DEPT VISIT LOW MDM: CPT

## 2019-05-04 PROCEDURE — 73030 X-RAY EXAM OF SHOULDER: CPT

## 2019-05-04 PROCEDURE — 74011250637 HC RX REV CODE- 250/637: Performed by: NURSE PRACTITIONER

## 2019-05-04 RX ORDER — KETOROLAC TROMETHAMINE 10 MG/1
10 TABLET, FILM COATED ORAL ONCE
Status: COMPLETED | OUTPATIENT
Start: 2019-05-04 | End: 2019-05-04

## 2019-05-04 RX ORDER — HYDROCHLOROTHIAZIDE 50 MG/1
25 TABLET ORAL DAILY
COMMUNITY

## 2019-05-04 RX ORDER — PREDNISONE 5 MG/1
TABLET ORAL
Qty: 21 TAB | Refills: 0 | Status: SHIPPED | OUTPATIENT
Start: 2019-05-04 | End: 2022-06-01

## 2019-05-04 RX ORDER — DEXTROMETHORPHAN HYDROBROMIDE, GUAIFENESIN 5; 100 MG/5ML; MG/5ML
650 LIQUID ORAL EVERY 8 HOURS
Qty: 20 TAB | Refills: 0 | Status: SHIPPED | OUTPATIENT
Start: 2019-05-04 | End: 2022-06-01

## 2019-05-04 RX ADMIN — KETOROLAC TROMETHAMINE 10 MG: 10 TABLET, FILM COATED ORAL at 20:01

## 2019-05-04 NOTE — ED NOTES
Pt presents ambulatory to ED complaining of left shoulder pain with tingling radiating down arm x3 weeks. Pt states she was seen 3 weeks ago for pain and was given Robaxin and ibuprofen, which has not relieved pain. Pt denies injury/trauma. Pt states last dose of pain med was 2 days ago. Pt reports pain 8/10 pain scale. Pt is alert and oriented x 4, RR even and unlabored, skin is warm and dry. Assesment completed and pt updated on plan of care. Emergency Department Nursing Plan of Care The Nursing Plan of Care is developed from the Nursing assessment and Emergency Department Attending provider initial evaluation. The plan of care may be reviewed in the ED Provider note. The Plan of Care was developed with the following considerations:  
Patient / Family readiness to learn indicated by:verbalized understanding Persons(s) to be included in education: patient Barriers to Learning/Limitations:No 
 
Signed Liya Carlos [facility]   
5/4/2019   7:12 PM

## 2019-05-04 NOTE — ED TRIAGE NOTES
Complains of ongoing left shoulder pain x 3 weeks. With tingling down arm and numbness in fingertips. Denies chest pain.

## 2019-05-05 DIAGNOSIS — G47.00 INSOMNIA, UNSPECIFIED TYPE: ICD-10-CM

## 2019-05-05 NOTE — ED NOTES
Discharge instructions were given to the patient by Cely Tamez NP. The patient left the Emergency Department ambulatory, alert and oriented and in no acute distress with 2 prescriptions. The patient was encouraged to call or return to the ED for worsening issues or problems and was encouraged to schedule a follow up appointment for continuing care. The patient verbalized understanding of discharge instructions and prescriptions, all questions were answered. The patient has no further concerns at this time.

## 2019-05-05 NOTE — DISCHARGE INSTRUCTIONS
Patient Education        Joint Injections: Care Instructions  Your Care Instructions    Joint injections are shots into a joint, such as the knee. They may be used to put in medicines, such as pain relievers. A corticosteroid, or steroid, shot is used to reduce inflammation in tendons or joints. It is often used to treat problems such as arthritis, tendinitis, and bursitis. Steroids can be injected directly into a painful, inflamed joint. They can also help reduce inflammation of a bursa. A bursa is a sac of fluid. It cushions and lubricates areas where tendons, ligaments, skin, muscles, or bones rub against each other. A steroid shot can sometimes help with short-term pain relief when other treatments haven't worked. If steroid shots help, pain may improve for weeks or months. Follow-up care is a key part of your treatment and safety. Be sure to make and go to all appointments, and call your doctor if you are having problems. It's also a good idea to know your test results and keep a list of the medicines you take. How can you care for yourself at home? · Put ice or a cold pack on the area for 10 to 20 minutes at a time. Put a thin cloth between the ice and your skin. · Ask your doctor if you can take an over-the-counter pain medicine, such as acetaminophen (Tylenol), ibuprofen (Advil, Motrin), or naproxen (Aleve). Be safe with medicines. Read and follow all instructions on the label. · Avoid strenuous activities for several days. In particular, avoid ones that put stress on the area where you got the shot. · If you have dressings over the area, keep them clean and dry. You may remove them when your doctor tells you to. When should you call for help? Call your doctor now or seek immediate medical care if:    · You have signs of infection, such as:  ? Increased pain, swelling, warmth, or redness. ? Red streaks leading from the site. ? Pus draining from the site.   ? A fever.    Watch closely for changes in your health, and be sure to contact your doctor if you have any problems. Where can you learn more? Go to http://abi-clifford.info/. Enter N616 in the search box to learn more about \"Joint Injections: Care Instructions. \"  Current as of: September 20, 2018  Content Version: 11.9  © 7284-7991 [employer]. Care instructions adapted under license by [employer] (which disclaims liability or warranty for this information). If you have questions about a medical condition or this instruction, always ask your healthcare professional. [address] any warranty or liability for your use of this information.

## 2019-05-05 NOTE — ED PROVIDER NOTES
EMERGENCY DEPARTMENT HISTORY AND PHYSICAL EXAM 
 
Date: 5/4/2019 Patient Name: Ang Mancilla History of Presenting Illness Chief Complaint Patient presents with  Shoulder Pain History Provided By: Patient Chief Complaint: shoulder pain Duration: 3 Weeks Timing:  Gradual and Worsening Location: left shoulder Quality: Burning and Pritesh Primmer Severity: 8 out of 10 Modifying Factors: moving shoulder worsens pain Associated Symptoms: denies any other associated signs or symptoms HPI: Ang Mancilla is a 38 y.o. female with a PMH of hypertension infectious disease who presents with left shoulder pain onset 3 weeks ago. Patient states she was evaluated in this emergency department and given medication but pain has been getting worse. Patient states she cannot sleep at night due to the pain. States when she wakes up in the morning her left arm is burning and tingling down to her hand. She denies injury she states she has been wearing a sling but it does not help with the pain. She also reports she has been babysitting for her 11month-old child and holding the baby in that arm. PCP: Bj Gonzalez MD 
 
Current Outpatient Medications Medication Sig Dispense Refill  hydroCHLOROthiazide (HYDRODIURIL) 50 mg tablet Take 25 mg by mouth daily.  predniSONE (STERAPRED) 5 mg dose pack See administration instruction per 5mg dose pack 21 Tab 0  
 acetaminophen (TYLENOL ARTHRITIS PAIN) 650 mg TbER Take 1 Tab by mouth every eight (8) hours. 20 Tab 0  
 venlafaxine-SR (EFFEXOR-XR) 75 mg capsule take 1 capsule by mouth once daily 30 Cap 2  
 methocarbamol (ROBAXIN) 500 mg tablet Take 1 Tab by mouth four (4) times daily. 30 Tab 0  chlorthalidone (HYGROTEN) 50 mg tablet Take 1 Tab by mouth daily. Indications: hypertension 60 Tab 5  ibuprofen (MOTRIN) 800 mg tablet Take 1 Tab by mouth every eight (8) hours as needed for Pain.  Take w food 30 Tab 0  
  zolpidem (AMBIEN) 5 mg tablet take 1 tablet by mouth at bedtime prn NOT TO BE USED EVERY NIGHT. IT IS A PRN medication 30 Tab 1  ALPRAZolam (XANAX) 1 mg tablet take 1 tablet by mouth twice a day 60 Tab 2 Past History Past Medical History: 
Past Medical History:  
Diagnosis Date  Hx MRSA infection  Hypertension  Infectious disease MRSA  Neurological disorder   
 right wrist carpal tunnel  Other ill-defined conditions(799.89) Chronic Back pain  Psychiatric disorder   
 depression  Ventral hernia without obstruction or gangrene 2018 Past Surgical History: 
Past Surgical History:  
Procedure Laterality Date  HX CARPAL TUNNEL RELEASE    
 right wrist  
 HX  SECTION    
 x3  
 HX CHOLECYSTECTOMY  HX HERNIA REPAIR    
 3/2011 Family History: 
Family History Problem Relation Age of Onset  Lung Disease Mother   
     sarcoidosis  Diabetes Father  Kidney Disease Father  Pacemaker Father Social History: 
Social History Tobacco Use  Smoking status: Current Every Day Smoker Packs/day: 1.50  Smokeless tobacco: Never Used Substance Use Topics  Alcohol use: Yes Comment: Occasionlly - holidays  Drug use: No  
 
 
Allergies: Allergies Allergen Reactions  Lisinopril Cough and Angioedema  Norvasc [Amlodipine] Other (comments) Ankle swelling Review of Systems Review of Systems Constitutional: Negative for fatigue and fever. Respiratory: Negative for shortness of breath and wheezing. Cardiovascular: Negative for chest pain and palpitations. Gastrointestinal: Negative for abdominal pain. Musculoskeletal: Negative for arthralgias (.shoulder pain), myalgias, neck pain and neck stiffness. Skin: Negative for pallor and rash. Neurological: Negative for dizziness, tremors, weakness and headaches. Hematological: Negative for adenopathy. All other systems reviewed and are negative. Physical Exam  
 
Vitals:  
 05/04/19 1807 BP: 145/88 Pulse: 90 Resp: 17 Temp: 98.2 °F (36.8 °C) SpO2: 98% Weight: 122.5 kg (270 lb) Height: 5' 4\" (1.626 m) Physical Exam  
Constitutional: She is oriented to person, place, and time. She appears well-developed and well-nourished. No distress. HENT:  
Head: Normocephalic and atraumatic. Right Ear: External ear normal.  
Left Ear: External ear normal.  
Nose: Nose normal.  
Mouth/Throat: Oropharynx is clear and moist.  
Eyes: Conjunctivae are normal.  
Neck: Normal range of motion. Neck supple. Cardiovascular: Normal rate, regular rhythm and normal heart sounds. Pulmonary/Chest: Effort normal and breath sounds normal. No respiratory distress. She has no wheezes. Abdominal: Soft. Bowel sounds are normal. There is no tenderness. Musculoskeletal: Normal range of motion. She exhibits tenderness. Tenderness left upper back. Pain on abduction. Distal neurovascular status intact. Lymphadenopathy:  
  She has no cervical adenopathy. Neurological: She is alert and oriented to person, place, and time. No cranial nerve deficit. Coordination normal.  
Skin: Skin is warm and dry. No rash noted. Psychiatric: She has a normal mood and affect. Her behavior is normal. Judgment and thought content normal.  
Nursing note and vitals reviewed. Diagnostic Study Results Labs - No results found for this or any previous visit (from the past 12 hour(s)). Radiologic Studies -  
XR SHOULDER LT AP/LAT MIN 2 V Final Result IMPRESSION: No acute abnormality. CT Results  (Last 48 hours) None CXR Results  (Last 48 hours) None Medical Decision Making I am the first provider for this patient. I reviewed the vital signs, available nursing notes, past medical history, past surgical history, family history and social history. Vital Signs-Reviewed the patient's vital signs. Records Reviewed: Nursing Notes and Old Medical Records Disposition: 
home DISCHARGE NOTE:  
 
  Care plan outlined and precautions discussed. Patient has no new complaints, changes, or physical findings. Results of xray were reviewed with the patient. All medications were reviewed with the patient; will d/c home with prednisone tylenol flexeril. All of pt's questions and concerns were addressed. Patient was instructed and agrees to follow up with Ortho, as well as to return to the ED upon further deterioration. Patient is ready to go home. Follow-up Information Follow up With Specialties Details Why Contact Info [address]  In 2 days  Marco Antonio Galindo Violette [address] [address] 
[phone] Current Discharge Medication List  
  
START taking these medications Details  
predniSONE (STERAPRED) 5 mg dose pack See administration instruction per 5mg dose pack Qty: 21 Tab, Refills: 0  
  
acetaminophen (TYLENOL ARTHRITIS PAIN) 650 mg TbER Take 1 Tab by mouth every eight (8) hours. Qty: 20 Tab, Refills: 0 Provider Notes (Medical Decision Making): DDX degenerative joint disease capsulitis impingement syndrome Procedures: 
Procedures Diagnosis Clinical Impression: 1. Acute pain of left shoulder

## 2019-05-06 RX ORDER — VENLAFAXINE HYDROCHLORIDE 75 MG/1
CAPSULE, EXTENDED RELEASE ORAL
Qty: 30 CAP | Refills: 2 | Status: SHIPPED | OUTPATIENT
Start: 2019-05-06 | End: 2022-06-01

## 2020-02-14 DIAGNOSIS — I10 ESSENTIAL HYPERTENSION: ICD-10-CM

## 2020-02-14 RX ORDER — HYDROCHLOROTHIAZIDE 50 MG/1
25 TABLET ORAL DAILY
OUTPATIENT
Start: 2020-02-14

## 2020-02-14 RX ORDER — CHLORTHALIDONE 50 MG/1
50 TABLET ORAL DAILY
Qty: 60 TAB | Refills: 1 | Status: SHIPPED | OUTPATIENT
Start: 2020-02-14

## 2020-02-14 NOTE — TELEPHONE ENCOUNTER
Patient needs a refill on her medication. She has an upcoming appointment on 03/04/20 for follow up on bp. Can you give her enough to last her until her appointment.

## 2022-03-18 PROBLEM — F32.A ANXIETY AND DEPRESSION: Status: ACTIVE | Noted: 2017-06-21

## 2022-03-18 PROBLEM — K43.9 VENTRAL HERNIA WITHOUT OBSTRUCTION OR GANGRENE: Status: ACTIVE | Noted: 2018-04-12

## 2022-03-18 PROBLEM — F41.9 ANXIETY AND DEPRESSION: Status: ACTIVE | Noted: 2017-06-21

## 2022-03-19 PROBLEM — I10 ESSENTIAL HYPERTENSION: Status: ACTIVE | Noted: 2018-04-20

## 2022-03-19 PROBLEM — L83 ACANTHOSIS NIGRICANS: Status: ACTIVE | Noted: 2018-04-20

## 2022-03-19 PROBLEM — R73.03 PREDIABETES: Status: ACTIVE | Noted: 2018-04-23

## 2022-03-19 PROBLEM — Z72.0 TOBACCO USE: Status: ACTIVE | Noted: 2017-10-12

## 2022-03-19 PROBLEM — G47.30 SLEEP APNEA: Status: ACTIVE | Noted: 2017-05-10

## 2022-03-19 PROBLEM — G47.00 INSOMNIA: Status: ACTIVE | Noted: 2018-04-20

## 2022-03-20 PROBLEM — G89.29 CHRONIC PAIN: Status: ACTIVE | Noted: 2017-05-10

## 2022-03-20 PROBLEM — E66.01 OBESITY, MORBID (HCC): Status: ACTIVE | Noted: 2018-04-12

## 2022-03-20 PROBLEM — R73.9 ELEVATED BLOOD SUGAR: Status: ACTIVE | Noted: 2018-04-20

## 2022-05-13 LAB — HBA1C MFR BLD HPLC: 13.3 %

## 2022-06-01 ENCOUNTER — HOSPITAL ENCOUNTER (EMERGENCY)
Age: 47
Discharge: HOME | End: 2022-06-01
Attending: EMERGENCY MEDICINE
Payer: MEDICARE

## 2022-06-01 ENCOUNTER — APPOINTMENT (OUTPATIENT)
Dept: GENERAL RADIOLOGY | Age: 47
End: 2022-06-01
Attending: EMERGENCY MEDICINE
Payer: MEDICARE

## 2022-06-01 VITALS
HEIGHT: 63 IN | OXYGEN SATURATION: 96 % | BODY MASS INDEX: 47.84 KG/M2 | DIASTOLIC BLOOD PRESSURE: 90 MMHG | WEIGHT: 270 LBS | SYSTOLIC BLOOD PRESSURE: 147 MMHG | HEART RATE: 100 BPM | RESPIRATION RATE: 18 BRPM | TEMPERATURE: 98.5 F

## 2022-06-01 DIAGNOSIS — R55 SYNCOPE AND COLLAPSE: ICD-10-CM

## 2022-06-01 DIAGNOSIS — S09.90XA CLOSED HEAD INJURY, INITIAL ENCOUNTER: ICD-10-CM

## 2022-06-01 DIAGNOSIS — S93.492A SPRAIN OF ANTERIOR TALOFIBULAR LIGAMENT OF LEFT ANKLE, INITIAL ENCOUNTER: Primary | ICD-10-CM

## 2022-06-01 PROCEDURE — 93005 ELECTROCARDIOGRAM TRACING: CPT

## 2022-06-01 PROCEDURE — 99284 EMERGENCY DEPT VISIT MOD MDM: CPT

## 2022-06-01 PROCEDURE — 74011250637 HC RX REV CODE- 250/637: Performed by: EMERGENCY MEDICINE

## 2022-06-01 PROCEDURE — 73630 X-RAY EXAM OF FOOT: CPT

## 2022-06-01 RX ORDER — IBUPROFEN 600 MG/1
600 TABLET ORAL
Status: COMPLETED | OUTPATIENT
Start: 2022-06-01 | End: 2022-06-01

## 2022-06-01 RX ORDER — IBUPROFEN 600 MG/1
600 TABLET ORAL
Qty: 20 TABLET | Refills: 0 | Status: SHIPPED | OUTPATIENT
Start: 2022-06-01

## 2022-06-01 RX ORDER — ACETAMINOPHEN 500 MG
1000 TABLET ORAL
Status: COMPLETED | OUTPATIENT
Start: 2022-06-01 | End: 2022-06-01

## 2022-06-01 RX ADMIN — ACETAMINOPHEN 1000 MG: 500 TABLET ORAL at 13:28

## 2022-06-01 RX ADMIN — IBUPROFEN 600 MG: 600 TABLET ORAL at 13:28

## 2022-06-01 NOTE — ED NOTES
Emergency Department Nursing Plan of Care       The Nursing Plan of Care is developed from the Nursing assessment and Emergency Department Attending provider initial evaluation. The plan of care may be reviewed in the ED Provider note.     The Plan of Care was developed with the following considerations:   Patient / Family readiness to learn indicated by:verbalized understanding  Persons(s) to be included in education: patient  Barriers to Learning/Limitations:No    Signed     Rosario Auguste RN    6/1/2022   11:47 AM

## 2022-06-01 NOTE — ED TRIAGE NOTES
Pt states she passed out yesterday while at home. Pt states she hit her head on a car seat, pulled down a clothes rack and injured her left foot. C/o pain and swelling to anterior left foot.

## 2022-06-01 NOTE — ED PROVIDER NOTES
EMERGENCY DEPARTMENT HISTORY AND PHYSICAL EXAM      Date: 6/1/2022  Patient Name: Tara Bey    History of Presenting Illness     Chief Complaint   Patient presents with    Syncope    Foot Pain       History Provided By: Patient and Patient's     HPI: Tara Bey, 49 y.o. female with history of hypertension presents to the ED with cc of lightheadedness, syncope, left ankle pain. Symptoms occurred yesterday. She was doing laundry and she stood up too quickly and got lightheaded and had syncopal event. She struck her head on a car seat but denies any headache or neck pain ever since. She twisted her left ankle and has had difficulty ambulating on it ever since. She denies any photophobia, visual changes, chest pain, shortness of breath, or numbness or weakness in her extremities. Her only concern today is that the left foot which has swelling over the anterior lateral aspect and she is having difficulty bearing weight on it. Has not taken anything for pain today. There are no other complaints, changes, or physical findings at this time. PCP: Cydney Pastor MD    No current facility-administered medications on file prior to encounter. Current Outpatient Medications on File Prior to Encounter   Medication Sig Dispense Refill    chlorthalidone (HYGROTEN) 50 mg tablet Take 1 Tab by mouth daily. Indications: high blood pressure 60 Tab 1    hydroCHLOROthiazide (HYDRODIURIL) 50 mg tablet Take 25 mg by mouth daily.  [DISCONTINUED] venlafaxine-SR (EFFEXOR-XR) 75 mg capsule take 1 capsule by mouth once daily 30 Cap 1    [DISCONTINUED] venlafaxine-SR (EFFEXOR-XR) 75 mg capsule take 1 capsule by mouth once daily 30 Cap 2    [DISCONTINUED] predniSONE (STERAPRED) 5 mg dose pack See administration instruction per 5mg dose pack 21 Tab 0    [DISCONTINUED] acetaminophen (TYLENOL ARTHRITIS PAIN) 650 mg TbER Take 1 Tab by mouth every eight (8) hours.  20 Tab 0    [DISCONTINUED] methocarbamol (ROBAXIN) 500 mg tablet Take 1 Tab by mouth four (4) times daily. 30 Tab 0    [DISCONTINUED] ibuprofen (MOTRIN) 800 mg tablet Take 1 Tab by mouth every eight (8) hours as needed for Pain. Take w food 30 Tab 0    [DISCONTINUED] zolpidem (AMBIEN) 5 mg tablet take 1 tablet by mouth at bedtime prn NOT TO BE USED EVERY NIGHT. IT IS A PRN medication 30 Tab 1    [DISCONTINUED] ALPRAZolam (XANAX) 1 mg tablet take 1 tablet by mouth twice a day 60 Tab 2       Past History     Past Medical History:  Past Medical History:   Diagnosis Date    Hx MRSA infection     Hypertension     Infectious disease     MRSA    Neurological disorder     right wrist carpal tunnel    Other ill-defined conditions(799.89)     Chronic Back pain     Psychiatric disorder     depression    Ventral hernia without obstruction or gangrene 2018       Past Surgical History:  Past Surgical History:   Procedure Laterality Date    HX CARPAL TUNNEL RELEASE      right wrist    HX  SECTION      x3    HX CHOLECYSTECTOMY      HX HERNIA REPAIR      3/2011       Family History:  Family History   Problem Relation Age of Onset    Lung Disease Mother         sarcoidosis    Diabetes Father     Kidney Disease Father     Pacemaker Father        Social History:  Social History     Tobacco Use    Smoking status: Current Every Day Smoker     Packs/day: 1.50    Smokeless tobacco: Never Used   Substance Use Topics    Alcohol use: Yes     Comment: Occasionlly - holidays    Drug use: No       Allergies: Allergies   Allergen Reactions    Lisinopril Cough and Angioedema    Norvasc [Amlodipine] Other (comments)     Ankle swelling         Review of Systems   Review of Systems   Constitutional: Negative for chills and fever. Eyes: Negative for photophobia and visual disturbance. Respiratory: Negative for cough and shortness of breath. Cardiovascular: Negative for chest pain and leg swelling.    Gastrointestinal: Negative for abdominal pain, nausea and vomiting. Genitourinary: Negative. Musculoskeletal: Positive for arthralgias, gait problem and joint swelling. Negative for back pain. Skin: Negative for color change and rash. Neurological: Positive for syncope and light-headedness. Negative for dizziness, weakness and headaches. Hematological: Does not bruise/bleed easily. All other systems reviewed and are negative. Physical Exam   Physical Exam  Vitals and nursing note reviewed. Constitutional:       General: She is not in acute distress. Appearance: Normal appearance. She is not ill-appearing or toxic-appearing. HENT:      Head: Normocephalic and atraumatic. Nose: Nose normal.      Mouth/Throat:      Mouth: Mucous membranes are moist.   Eyes:      Extraocular Movements: Extraocular movements intact. Pupils: Pupils are equal, round, and reactive to light. Cardiovascular:      Rate and Rhythm: Normal rate and regular rhythm. Heart sounds: No murmur heard. Pulmonary:      Effort: Pulmonary effort is normal. No respiratory distress. Breath sounds: Normal breath sounds. No wheezing. Abdominal:      General: There is no distension. Palpations: Abdomen is soft. Musculoskeletal:         General: Swelling and tenderness present. Cervical back: Normal range of motion and neck supple. No tenderness. Right lower leg: No edema. Left lower leg: No edema. Comments: There is soft tissue swelling to the anterior lateral left ankle consistent with anterior talofibular ankle sprain. Limited range of motion secondary to pain and swelling. No reproducible bony TTP except for over the anterior lateral foot and fifth metatarsal.   Skin:     General: Skin is warm and dry. Coloration: Skin is not pale. Findings: No erythema. Neurological:      General: No focal deficit present. Mental Status: She is alert and oriented to person, place, and time.       Comments: Cranial nerves intact, motor 5/5 throughout, sensation intact, no cerebellar deficits. Diagnostic Study Results     Labs -     Recent Results (from the past 12 hour(s))   EKG, 12 LEAD, INITIAL    Collection Time: 06/01/22 12:22 PM   Result Value Ref Range    Ventricular Rate 82 BPM    Atrial Rate 82 BPM    P-R Interval 182 ms    QRS Duration 80 ms    Q-T Interval 384 ms    QTC Calculation (Bezet) 448 ms    Calculated P Axis 66 degrees    Calculated R Axis 27 degrees    Calculated T Axis 48 degrees    Diagnosis       Normal sinus rhythm  Anterior infarct , age undetermined  Abnormal ECG  When compared with ECG of 21-MAR-2004 22:32,  Previous ECG has undetermined rhythm, needs review  QRS voltage has decreased  Anterior infarct is now present         Radiologic Studies -   XR FOOT LT MIN 3 V   Final Result   No acute abnormality. CT Results  (Last 48 hours)    None        CXR Results  (Last 48 hours)    None          Medical Decision Making   I am the first provider for this patient. I reviewed the vital signs, available nursing notes, past medical history, past surgical history, family history and social history. Vital Signs-Reviewed the patient's vital signs. Patient Vitals for the past 12 hrs:   Temp Pulse Resp BP SpO2   06/01/22 1119 98.5 °F (36.9 °C) 100 18 (!) 147/90 96 %       Records Reviewed: Nursing Notes    Provider Notes (Medical Decision Making):   41-year-old female here with close head injury and left ankle pain and swelling after syncopal event yesterday. Syncope appears consistent with orthostatic lightheadedness. She denies any preceding chest pain, palpitations, or shortness of breath and syncope occurred right after she stood up too quickly. She also struck her head during the syncopal event but denies any headache or change in her vision and has no focal deficits on my exam.  She does not have concern about syncope or ground-level fall.   EKG obtained which does not demonstrate any acute ischemic changes or interval changes. She has no focal deficits on exam and no complaint of headache or visual changes and I do not feel CT head is warranted at this time. Plain film of the left foot negative for acute fracture. We will treat with RICE therapy, Ace wrap compression, crutches, Tylenol and ibuprofen. Patient given strict return precautions all questions answered. ED Course:   Initial assessment performed. The patients presenting problems have been discussed, and they are in agreement with the care plan formulated and outlined with them. I have encouraged them to ask questions as they arise throughout their visit. ED Course as of 06/01/22 1258   Wed Jun 01, 2022   1224 EKG per my interpretation normal sinus rhythm, rate 82 bpm, normal axis, no acute ischemic changes or interval changes. [AK]      ED Course User Index  [AK] Les Winter MD       Discharge Note:  The patient has been re-evaluated and is ready for discharge. Reviewed available results with patient. Counseled patient on diagnosis and care plan. Patient has expressed understanding, and all questions have been answered. Patient agrees with plan and agrees to follow up as recommended, or to return to the ED if their symptoms worsen. Discharge instructions have been provided and explained to the patient, along with reasons to return to the ED. Disposition:  Discharge    DISCHARGE PLAN:  1. Current Discharge Medication List        2. Follow-up Information     Follow up With Specialties Details Why Contact Info    Kimberly Greenberg MD Internal Medicine Physician On 6/9/2022 Your appointment time 1000, THIS IS A VIRTUAL APPOINTMENT, Please arrive 15 mins prior to appointment, nurse will call you 15 mins prior. [address]  [phone]          3. Return to ED if worse     Diagnosis     Clinical Impression: No diagnosis found.     Attestations:  I am the first and primary provider of record for this patient's ED encounter. I personally performed the services described above in this documentation. Rhina Pena MD    Please note that this dictation was completed with [employer], the computer voice recognition software. Quite often unanticipated grammatical, syntax, homophones, and other interpretive errors are inadvertently transcribed by the computer software. Please disregard these errors. Please excuse any errors that have escaped final proofreading. Thank you.

## 2022-06-01 NOTE — DISCHARGE INSTRUCTIONS
You were evaluated in the emergency department for syncopal episode resulting in left ankle sprain. Your examination was reassuring as was your work-up including x-ray of the left foot and ankle as well as EKG. It will be important for you to follow-up with your primary care physician in 3-7 days. If you develop worsening symptoms such as chest pain, shortness of breath, or recurrent passing out episodes, please return to the emergency department immediately.

## 2022-06-01 NOTE — PROGRESS NOTES
SEN[place]: ACO patient follow-up. CM opened case for assessment of D/C planning needs, CM reviewed chart. CM schedule follow-up for patient with PCP. VIRTUAL appointment on 6/9/2022 @ 1000.     [address] [facility], [address] [phone]

## 2022-06-03 LAB
ATRIAL RATE: 82 BPM
CALCULATED P AXIS, ECG09: 66 DEGREES
CALCULATED R AXIS, ECG10: 27 DEGREES
CALCULATED T AXIS, ECG11: 48 DEGREES
DIAGNOSIS, 93000: NORMAL
P-R INTERVAL, ECG05: 182 MS
Q-T INTERVAL, ECG07: 384 MS
QRS DURATION, ECG06: 80 MS
QTC CALCULATION (BEZET), ECG08: 448 MS
VENTRICULAR RATE, ECG03: 82 BPM

## 2023-07-27 NOTE — PROGRESS NOTES
Skyla Jackson is a 39 y.o. female and presents with Headache  . Subjective:  Pt arrives 15 minutes late for appointment  Pt missed her July appt  Pt did not schedule her mammo  Pt w c/o her lisinopril is causing her to cough and the \"sides of her tongue are swelling\". Pt almost fainted 4 days ago. She fell and scraped her knees. Pt denies lightheadedness/palpitations/chest pain/SOB/le edema. ... Pt w c/o \"migraine headaches\" x 5 days. Described as bifrontal w radiation to her neck a/w tightness. Pt denies nausea/aura/unilateral pain. Pt has been taking BC powder w temporary relief      Depression and anxiety- She was seeing NP Jordyn Sullivan in our psychiatry dept. Pts last appt .   -pt has not been back to see them    [facility]-pt relays she was seen on 18 @ [address], and did home sleep study. They were supposed to call her for f/u sleep study, but she hasn't heard from them. There is NO documentation in her chart of any visit      Morbid obesity-  Wt Readings from Last 3 Encounters:   09/10/18 287 lb 12.8 oz (130.5 kg)   18 286 lb (129.7 kg)   18 280 lb (127 kg)       HTN  BP Readings from Last 3 Encounters:   09/10/18 120/80   18 113/57   18 (P) 118/68             Review of Systems  Review of systems (12) negative, except noted above.       Past Medical History:   Diagnosis Date    Hx MRSA infection     Hypertension     Infectious disease     MRSA    Neurological disorder     right wrist carpal tunnel    Other ill-defined conditions(799.89)     Chronic Back pain     Psychiatric disorder     depression    Ventral hernia without obstruction or gangrene 2018     Past Surgical History:   Procedure Laterality Date    HX CARPAL TUNNEL RELEASE      right wrist    HX  SECTION      x3    HX CHOLECYSTECTOMY      HX HERNIA REPAIR      3/2011     Social History     Social History    Marital status: LEGALLY      Spouse name: N/A    Number of children: N/A    Years of education: N/A     Social History Main Topics    Smoking status: Current Every Day Smoker     Packs/day: 1.50    Smokeless tobacco: Never Used    Alcohol use Yes      Comment: Occasionlly - holidays    Drug use: No    Sexual activity: Yes     Partners: Male     Birth control/ protection: Condom     Other Topics Concern    None     Social History Narrative    4/27/17: on disability for depression, spends most days at home. Family History   Problem Relation Age of Onset    Lung Disease Mother      sarcoidosis    Diabetes Father     Kidney Disease Father     Pacemaker Father      Current Outpatient Prescriptions   Medication Sig Dispense Refill    chlorthalidone (HYGROTEN) 50 mg tablet Take 1 Tab by mouth daily. Indications: hypertension 60 Tab 5    ibuprofen (MOTRIN) 800 mg tablet Take 1 Tab by mouth every eight (8) hours as needed for Pain. Take w food 30 Tab 0    venlafaxine-SR (EFFEXOR-XR) 75 mg capsule take 1 capsule by mouth once daily 30 Cap 2    zolpidem (AMBIEN) 5 mg tablet take 1 tablet by mouth at bedtime prn NOT TO BE USED EVERY NIGHT.  IT IS A PRN medication 30 Tab 1    ALPRAZolam (XANAX) 1 mg tablet take 1 tablet by mouth twice a day 60 Tab 2     Allergies   Allergen Reactions    Lisinopril Cough and Angioedema       Objective:  Visit Vitals    /80 (BP 1 Location: Left arm, BP Patient Position: Sitting)    Pulse 87    Temp 99 °F (37.2 °C) (Oral)    Resp 18    Ht 5' 4\" (1.626 m)    Wt 287 lb 12.8 oz (130.5 kg)    LMP 08/25/2018 (Exact Date)    SpO2 98%    BMI 49.4 kg/m2     Physical Exam:   General appearance - alert, morbidly obese lady in NAD  Mental status - alert, oriented to person, place, and time  EYE-MOHIT, EOMI, corneas normal, no foreign bodies  ENT-ENT exam normal, no neck nodes or sinus tenderness  Mouth - mucous membranes moist, pharynx normal without lesions  Neck - supple, no significant adenopathy + hyperpig posterior neck  Chest - clear to auscultation, no wheezes, rales or rhonchi, symmetric air entry   Heart - normal rate, regular rhythm, normal S1, S2  Abdomen - soft, obese  Genital exam-nml female external genitalia, copius amts of thisk, whitish d/c in vag vault and on vaginal walls  Ext-peripheral pulses normal, no pedal edema, no clubbing or cyanosis  Skin-Warm and dry. no hyperpigmentation, vitiligo, or suspicious lesions  Neuro -alert, oriented, normal speech, no focal findings or movement disorder noted        Results for orders placed or performed during the hospital encounter of 05/24/18   URINALYSIS W/ REFLEX CULTURE   Result Value Ref Range    Color YELLOW/STRAW      Appearance CLEAR CLEAR      Specific gravity <1.005 1.003 - 1.030    pH (UA) 5.5 5.0 - 8.0      Protein NEGATIVE  NEG mg/dL    Glucose NEGATIVE  NEG mg/dL    Ketone NEGATIVE  NEG mg/dL    Bilirubin NEGATIVE  NEG      Blood NEGATIVE  NEG      Urobilinogen 0.2 0.2 - 1.0 EU/dL    Nitrites NEGATIVE  NEG      Leukocyte Esterase TRACE (A) NEG      WBC 0-4 0 - 4 /hpf    RBC 0-5 0 - 5 /hpf    Epithelial cells FEW FEW /lpf    Bacteria NEGATIVE  NEG /hpf    UA:UC IF INDICATED CULTURE NOT INDICATED BY UA RESULT CNI     HCG URINE, QL. - POC   Result Value Ref Range    Pregnancy test,urine (POC) NEGATIVE  NEG         Assessment/Plan:    ICD-10-CM ICD-9-CM    1. Essential hypertension I10 401.9 chlorthalidone (HYGROTEN) 50 mg tablet   2. Chronic tension-type headache, not intractable G44.229 339.12 ibuprofen (MOTRIN) 800 mg tablet      REFERRAL TO NEUROLOGY   3. Obesity, morbid (HCC) E66.01 278.01 REFERRAL TO SLEEP STUDIES   4. Prediabetes R73.03 790.29    5. Obstructive sleep apnea syndrome G47.33 327.23 REFERRAL TO SLEEP STUDIES   6.  Encounter for immunization Z23 V03.89 INFLUENZA VIRUS VAC QUAD,SPLIT,PRESV FREE SYRINGE IM     Orders Placed This Encounter    INFLUENZA VIRUS VACCINE QUADRIVALENT, PRESERVATIVE FREE SYRINGE (96161)    [facility] ref [facility]     Referral Priority: Routine     Referral Type:   Consultation     Referral Reason:   Specialty Services Required     Referred to Provider:   Ness Bradley MD    REFERRAL TO SLEEP STUDIES     Referral Priority:   Routine     Referral Type:   Consultation     Referral Reason:   Specialty Services Required     Referred to Provider:   Maryam Moreau MD     Requested Specialty:   Sleep Medicine    chlorthalidone (HYGROTEN) 50 mg tablet     Sig: Take 1 Tab by mouth daily. Indications: hypertension     Dispense:  60 Tab     Refill:  5    ibuprofen (MOTRIN) 800 mg tablet     Sig: Take 1 Tab by mouth every eight (8) hours as needed for Pain. Take w food     Dispense:  30 Tab     Refill:  0   1. Essential hypertension  Will change bp medication due to SE  - chlorthalidone (HYGROTEN) 50 mg tablet; Take 1 Tab by mouth daily. Indications: hypertension  Dispense: 60 Tab; Refill: 5    2. Chronic tension-type headache, not intractable  Trial NSAIDS  - ibuprofen (MOTRIN) 800 mg tablet; Take 1 Tab by mouth every eight (8) hours as needed for Pain. Take w food  Dispense: 30 Tab; Refill: 0  - [facility] ref [facility]    3. Obesity, morbid ([address]    - REFERRAL TO SLEEP STUDIES    4. Prediabetes      5. Obstructive sleep apnea syndrome  Pt given contact # for sleep study to f/u  - REFERRAL TO SLEEP STUDIES    6. Encounter for immunization  Given  - INFLUENZA VIRUS VACCINE QUADRIVALENT, PRESERVATIVE FREE SYRINGE ([id number])    There are no Patient Instructions on file for this visit. Follow-up Disposition:  Return in about 3 months (around 12/10/2018) for bp check. I have reviewed with the patient details of the assessment and plan and all questions were answered. Relevent patient education was performed. The most recent lab findings were reviewed with the patient. An After Visit Summary was printed and given to the patient. Tazorac Pregnancy And Lactation Text: This medication is not safe during pregnancy. It is unknown if this medication is excreted in breast milk.

## 2024-04-09 ENCOUNTER — APPOINTMENT (OUTPATIENT)
Facility: HOSPITAL | Age: 49
End: 2024-04-09
Payer: MEDICARE

## 2024-04-09 ENCOUNTER — HOSPITAL ENCOUNTER (EMERGENCY)
Facility: HOSPITAL | Age: 49
Discharge: HOME | End: 2024-04-09
Attending: EMERGENCY MEDICINE
Payer: MEDICARE

## 2024-04-09 VITALS
BODY MASS INDEX: 45.75 KG/M2 | HEIGHT: 64 IN | HEART RATE: 96 BPM | TEMPERATURE: 98.3 F | WEIGHT: 268 LBS | OXYGEN SATURATION: 98 % | DIASTOLIC BLOOD PRESSURE: 67 MMHG | RESPIRATION RATE: 20 BRPM | SYSTOLIC BLOOD PRESSURE: 126 MMHG

## 2024-04-09 DIAGNOSIS — S83.411A SPRAIN OF MEDIAL COLLATERAL LIGAMENT OF RIGHT KNEE, INITIAL ENCOUNTER: Primary | ICD-10-CM

## 2024-04-09 DIAGNOSIS — M25.561 ACUTE PAIN OF RIGHT KNEE: ICD-10-CM

## 2024-04-09 PROCEDURE — 6370000000 HC RX 637 (ALT 250 FOR IP): Performed by: EMERGENCY MEDICINE

## 2024-04-09 PROCEDURE — 99284 EMERGENCY DEPT VISIT MOD MDM: CPT

## 2024-04-09 PROCEDURE — 73562 X-RAY EXAM OF KNEE 3: CPT

## 2024-04-09 PROCEDURE — 6360000002 HC RX W HCPCS: Performed by: EMERGENCY MEDICINE

## 2024-04-09 PROCEDURE — 96372 THER/PROPH/DIAG INJ SC/IM: CPT

## 2024-04-09 RX ORDER — KETOROLAC TROMETHAMINE 30 MG/ML
30 INJECTION, SOLUTION INTRAMUSCULAR; INTRAVENOUS ONCE
Status: COMPLETED | OUTPATIENT
Start: 2024-04-09 | End: 2024-04-09

## 2024-04-09 RX ORDER — OXYCODONE HYDROCHLORIDE 5 MG/1
5 TABLET ORAL
Status: COMPLETED | OUTPATIENT
Start: 2024-04-09 | End: 2024-04-09

## 2024-04-09 RX ORDER — IBUPROFEN 800 MG/1
800 TABLET ORAL EVERY 8 HOURS PRN
Qty: 24 TABLET | Refills: 0 | Status: SHIPPED | OUTPATIENT
Start: 2024-04-09

## 2024-04-09 RX ORDER — OXYCODONE HYDROCHLORIDE 5 MG/1
5 TABLET ORAL EVERY 6 HOURS PRN
Qty: 12 TABLET | Refills: 0 | Status: SHIPPED | OUTPATIENT
Start: 2024-04-09 | End: 2024-04-12

## 2024-04-09 RX ADMIN — OXYCODONE HYDROCHLORIDE 5 MG: 5 TABLET ORAL at 23:48

## 2024-04-09 RX ADMIN — KETOROLAC TROMETHAMINE 30 MG: 30 INJECTION, SOLUTION INTRAMUSCULAR; INTRAVENOUS at 23:48

## 2024-04-09 ASSESSMENT — PAIN SCALES - GENERAL
PAINLEVEL_OUTOF10: 10
PAINLEVEL_OUTOF10: 9

## 2024-04-09 ASSESSMENT — PAIN DESCRIPTION - ORIENTATION: ORIENTATION: RIGHT

## 2024-04-09 ASSESSMENT — PAIN DESCRIPTION - LOCATION: LOCATION: KNEE

## 2024-04-09 ASSESSMENT — PAIN - FUNCTIONAL ASSESSMENT: PAIN_FUNCTIONAL_ASSESSMENT: 0-10

## 2024-04-09 ASSESSMENT — LIFESTYLE VARIABLES
HOW OFTEN DO YOU HAVE A DRINK CONTAINING ALCOHOL: PATIENT DECLINED
HOW MANY STANDARD DRINKS CONTAINING ALCOHOL DO YOU HAVE ON A TYPICAL DAY: PATIENT DECLINED

## 2024-04-09 ASSESSMENT — PAIN DESCRIPTION - DESCRIPTORS: DESCRIPTORS: SHOOTING

## 2024-04-10 NOTE — ED NOTES
Emergency Department Nursing Plan of Care    See triage note.       The Nursing Plan of Care is developed from the Nursing assessment and Emergency Department Attending provider initial evaluation.  The plan of care may be reviewed in the “ED Provider note”.    The Plan of Care was developed with the following considerations:  Patient / Family readiness to learn indicated by:verbalized understanding  Persons(s) to be included in education: patient  Barriers to Learning/Limitations:None    Signed    Neda Daugherty RN    4/9/2024   11:42 PM

## 2024-04-10 NOTE — ED PROVIDER NOTES
General: No focal deficit present.      Mental Status: She is alert and oriented to person, place, and time.   Psychiatric:         Mood and Affect: Mood normal.         Behavior: Behavior normal.          DIAGNOSTIC RESULTS   LABS:     No results found for this or any previous visit (from the past 24 hour(s)).    EKG: If performed, independent interpretation documented below in the MDM section     RADIOLOGY:  Non-plain film images such as CT, Ultrasound and MRI are read by the radiologist. Plain radiographic images are visualized and preliminarily interpreted by the ED Provider with the findings documented in the MDM section.     Interpretation per the Radiologist below, if available at the time of this note:     XR KNEE RIGHT (3 VIEWS)   Final Result   Mild degenerative changes without acute abnormality.           PROCEDURES   Unless otherwise noted below, none  Procedures   Procedure Note - Splint Placement:  11:44 PM EDT  Performed by: Roel Wyatt DO   Neurovascularly intact prior to tx. a Velcro knee immobilizer was placed over the patient's right knee, patient neurovascularly intact status postplacement..  Joint was placed in extension.  Neurovascularly intact after tx.   The procedure took 1-15 minutes, and pt tolerated well.    CRITICAL CARE TIME       EMERGENCY DEPARTMENT COURSE and DIFFERENTIAL DIAGNOSIS/MDM   Vitals:    Vitals:    04/09/24 2244   BP: 126/67   Pulse: 96   Resp: 20   Temp: 98.3 °F (36.8 °C)   TempSrc: Oral   SpO2: 98%   Weight: 121.6 kg (268 lb)   Height: 1.626 m (5' 4\")        Patient was given the following medications:  Medications   oxyCODONE (ROXICODONE) immediate release tablet 5 mg (has no administration in time range)   ketorolac (TORADOL) injection 30 mg (has no administration in time range)       Medical Decision Making  Likely MCL injury and possible ACL and PCL injury.  X-ray shows no evidence of fracture.  Will provide analgesia, knee immobilizer.  Patient will need follow-up

## 2024-04-10 NOTE — ED NOTES
Discharge instructions were given to the patient by Neda Daugherty RN.    The patient left the Emergency Department ambulatory, alert and oriented and in no acute distress with 2 prescriptions. The patient was encouraged to call or return to the ED for worsening issues or problems and was encouraged to schedule a follow up appointment for continuing care.    The patient verbalized understanding of discharge instructions and prescriptions, all questions were answered. The patient has no further concerns at this time.        Pt to f.u with ortho. Pt waiting for ride from daughter. Assisted with WC

## 2024-04-11 ENCOUNTER — OFFICE VISIT (OUTPATIENT)
Age: 49
End: 2024-04-11
Payer: MEDICARE

## 2024-04-11 VITALS
OXYGEN SATURATION: 99 % | HEIGHT: 64 IN | WEIGHT: 268 LBS | HEART RATE: 89 BPM | RESPIRATION RATE: 20 BRPM | BODY MASS INDEX: 45.75 KG/M2 | DIASTOLIC BLOOD PRESSURE: 86 MMHG | SYSTOLIC BLOOD PRESSURE: 127 MMHG

## 2024-04-11 DIAGNOSIS — S83.411A SPRAIN OF MEDIAL COLLATERAL LIGAMENT OF RIGHT KNEE, INITIAL ENCOUNTER: Primary | ICD-10-CM

## 2024-04-11 PROCEDURE — 3079F DIAST BP 80-89 MM HG: CPT | Performed by: ORTHOPAEDIC SURGERY

## 2024-04-11 PROCEDURE — 99204 OFFICE O/P NEW MOD 45 MIN: CPT | Performed by: ORTHOPAEDIC SURGERY

## 2024-04-11 PROCEDURE — 3074F SYST BP LT 130 MM HG: CPT | Performed by: ORTHOPAEDIC SURGERY

## 2024-04-11 RX ORDER — DICLOFENAC POTASSIUM 50 MG/1
50 TABLET, FILM COATED ORAL 3 TIMES DAILY PRN
Qty: 60 TABLET | Refills: 0 | Status: SHIPPED | OUTPATIENT
Start: 2024-04-11

## 2024-04-11 RX ORDER — TRAMADOL HYDROCHLORIDE 50 MG/1
50 TABLET ORAL EVERY 8 HOURS PRN
Qty: 21 TABLET | Refills: 0 | Status: SHIPPED | OUTPATIENT
Start: 2024-04-11 | End: 2024-04-18

## 2024-04-11 ASSESSMENT — PATIENT HEALTH QUESTIONNAIRE - PHQ9
SUM OF ALL RESPONSES TO PHQ QUESTIONS 1-9: 0
2. FEELING DOWN, DEPRESSED OR HOPELESS: NOT AT ALL
1. LITTLE INTEREST OR PLEASURE IN DOING THINGS: NOT AT ALL
SUM OF ALL RESPONSES TO PHQ QUESTIONS 1-9: 0
SUM OF ALL RESPONSES TO PHQ9 QUESTIONS 1 & 2: 0

## 2024-04-11 NOTE — PROGRESS NOTES
Identified pt with two pt identifiers (name and ). Reviewed chart in preparation for visit and have obtained necessary documentation.    Yessica Robison is a 49 y.o. female Knee Injury (Rt knee injury due to fall )  .    Vitals:    24 1017   BP: 127/86   Site: Right Upper Arm   Position: Sitting   Cuff Size: Large Adult   Pulse: 89   Resp: 20   SpO2: 99%   Weight: 121.6 kg (268 lb)   Height: 1.626 m (5' 4\")          1. Have you been to the ER, urgent care clinic since your last visit?  Hospitalized since your last visit?  no     2. Have you seen or consulted any other health care providers outside of the [facility] since your last visit?  Include any pap smears or colon screening.  no  
well maintained.  Overall alignment is within normal limits, no effusion or other soft tissue abnormality.      Assessment: Pain in right knee, sprain MCL    Plan:  This patient I had a long discussion regarding her treatment options, as of now, there is no indication of significant intra-articular derangement, she has no effusion and therefore is unlikely to have torn her ACL, PCL.  Her clinical situation is more towards an MCL sprain as it is stable but painful.  I have given her a brace in office today, she will start therapy, have given her tramadol for pain control and she will follow-up with me in 2 weeks for clinical check.        Ms. Robison has a reminder for a \"due or due soon\" health maintenance. I have asked that she contact her primary care provider for follow-up on this health maintenance.

## 2024-07-09 DIAGNOSIS — S83.91XA SPRAIN OF RIGHT KNEE, UNSPECIFIED LIGAMENT, INITIAL ENCOUNTER: Primary | ICD-10-CM

## 2024-11-21 ENCOUNTER — OFFICE VISIT (OUTPATIENT)
Age: 49
End: 2024-11-21

## 2024-11-21 VITALS
SYSTOLIC BLOOD PRESSURE: 127 MMHG | HEART RATE: 85 BPM | DIASTOLIC BLOOD PRESSURE: 84 MMHG | TEMPERATURE: 98.1 F | RESPIRATION RATE: 16 BRPM | BODY MASS INDEX: 43.43 KG/M2 | OXYGEN SATURATION: 96 % | WEIGHT: 253 LBS

## 2024-11-21 DIAGNOSIS — J01.00 ACUTE NON-RECURRENT MAXILLARY SINUSITIS: Primary | ICD-10-CM

## 2024-11-21 RX ORDER — FLUTICASONE PROPIONATE 50 MCG
1 SPRAY, SUSPENSION (ML) NASAL DAILY
Qty: 16 G | Refills: 0 | Status: SHIPPED | OUTPATIENT
Start: 2024-11-21

## 2024-11-21 NOTE — PROGRESS NOTES
2024   Yessica Robison (: 1975) is a 49 y.o. female, New patient, here for evaluation of the following chief complaint(s):  Cold Symptoms (Cough and congestion x  Tuesday night)     ASSESSMENT/PLAN:  Below is the assessment and plan developed based on review of pertinent history, physical exam, labs, studies, and medications.  Assessment & Plan  Acute non-recurrent maxillary sinusitis       Orders:    fluticasone (FLONASE) 50 MCG/ACT nasal spray; 1 spray by Each Nostril route daily  Exam and history consistent with acute sinusitis.   Flonase 1 squirt each nostril, once a day  Mucinex Fastmax, Tylenol severe cold and flu, or DayQuil for symptomatic relief and decongestion  Zyrtec/Xyzal/Allegra/Claritin during the day or Benadryl at night may help with allergies.   Simple foods like chicken noodle soup, smoothies, hot tea with lemon and honey may also help  Steam inhalation, humidifier, warm compresses  Increase oral fluids to maintain hydration  Avoid smoking and minimize contact with environmental irritants    Please follow up with your primary care provider if your symptoms last more than 10 days or worsen.    Please go immediately to the Emergency Department if you develop:  Fever higher than 102F (38.9C), sudden and severe pain in the face and head, trouble seeing or seeing double, trouble thinking clearly, swelling or redness around one or both eyes or a stiff neck      Handout given with care instructions  2. OTC for symptom management. Increase fluid intake, ensure adequate nutritional intake.  3. Follow up with PCP as needed.  4. Go to ED with development of any acute symptoms.     Follow up:  No follow-ups on file.  Follow up immediately for any new, worsening or changes or if symptoms are not improving over the next 5-7 days.     SUBJECTIVE/OBJECTIVE:    Cold Symptoms        Ms Robison presents with complaints of cough and congestion since Tuesday night.  She states she has not had a fever

## 2024-11-21 NOTE — PATIENT INSTRUCTIONS
Thank you for visiting [facility] today.    Flonase 1 squirt each nostril, once a day  Mucinex Fastmax, Tylenol severe cold and flu, or DayQuil for symptomatic relief and decongestion  Zyrtec/Xyzal/Allegra/Claritin during the day or Benadryl at night may help with allergies.   Simple foods like chicken noodle soup, smoothies, hot tea with lemon and honey may also help  Steam inhalation, humidifier, warm compresses  Increase oral fluids to maintain hydration  Avoid smoking and minimize contact with environmental irritants    Please follow up with your primary care provider if your symptoms last more than 10 days or worsen.    Please go immediately to the Emergency Department if you develop:  Fever higher than 102F (38.9C), sudden and severe pain in the face and head, trouble seeing or seeing double, trouble thinking clearly, swelling or redness around one or both eyes or a stiff neck